# Patient Record
Sex: MALE | Race: WHITE | HISPANIC OR LATINO | Employment: FULL TIME | URBAN - METROPOLITAN AREA
[De-identification: names, ages, dates, MRNs, and addresses within clinical notes are randomized per-mention and may not be internally consistent; named-entity substitution may affect disease eponyms.]

---

## 2019-11-18 ENCOUNTER — APPOINTMENT (OUTPATIENT)
Dept: RADIOLOGY | Facility: CLINIC | Age: 34
End: 2019-11-18
Payer: COMMERCIAL

## 2019-11-18 ENCOUNTER — OFFICE VISIT (OUTPATIENT)
Dept: OBGYN CLINIC | Facility: CLINIC | Age: 34
End: 2019-11-18

## 2019-11-18 VITALS
WEIGHT: 262 LBS | DIASTOLIC BLOOD PRESSURE: 72 MMHG | BODY MASS INDEX: 41.12 KG/M2 | SYSTOLIC BLOOD PRESSURE: 109 MMHG | HEART RATE: 79 BPM | HEIGHT: 67 IN

## 2019-11-18 DIAGNOSIS — M25.562 LEFT KNEE PAIN, UNSPECIFIED CHRONICITY: ICD-10-CM

## 2019-11-18 DIAGNOSIS — M25.562 LEFT KNEE PAIN, UNSPECIFIED CHRONICITY: Primary | ICD-10-CM

## 2019-11-18 DIAGNOSIS — M23.92 INTERNAL DERANGEMENT OF LEFT KNEE: ICD-10-CM

## 2019-11-18 PROCEDURE — 73562 X-RAY EXAM OF KNEE 3: CPT

## 2019-11-18 PROCEDURE — 99203 OFFICE O/P NEW LOW 30 MIN: CPT | Performed by: ORTHOPAEDIC SURGERY

## 2019-11-18 RX ORDER — ACETAMINOPHEN 325 MG/1
650 TABLET ORAL EVERY 6 HOURS PRN
COMMUNITY
End: 2022-06-29

## 2019-11-18 RX ORDER — IBUPROFEN 200 MG
TABLET ORAL EVERY 6 HOURS PRN
COMMUNITY
End: 2022-06-29

## 2019-11-18 NOTE — PROGRESS NOTES
Assessment/Plan:  1  Left knee pain, unspecified chronicity  XR knee 3 vw left non injury    MRI knee left  wo contrast   2  Internal derangement of left knee       The patient does have a history and examination consistent with a medial meniscus tear  We are ordering an MRI to rule this out  We did discuss arthroscopic medial meniscectomy showed a tear be found  He will follow up after the MRI to discuss the results and how we will proceed  For now, he may ice the knee and take over-the-counter medications as needed for pain  Subjective:   Zaida Llanos is a 30 y o  male who presents today for evaluation of his left knee  He states her first injured this about 2 years ago when he hit it on something at his [employer]  This bothering him only very intermittently after this injury, but then about a year ago he states that he sustained a significant ankle sprain, and twisted his knee at the same time  Since that time, the knee has been bothering him on a more consistent basis  He notes pain mostly about the medial aspect of the knee  This is worse with activity and better with rest  He notes good ROM of the knee and denies any instability  He does note occasional clicking about the knee  He does not intermittent swelling  Review of Systems   Constitutional: Negative  Negative for chills and fever  HENT: Negative  Negative for ear pain and sore throat  Eyes: Negative  Negative for pain and redness  Respiratory: Negative  Negative for shortness of breath and wheezing  Cardiovascular: Negative for chest pain and palpitations  Gastrointestinal: Negative  Negative for abdominal pain and blood in stool  Endocrine: Negative  Negative for polydipsia and polyuria  Genitourinary: Negative  Negative for difficulty urinating and dysuria  Musculoskeletal:        As noted in HPI   Skin: Negative  Negative for pallor and rash  Neurological: Negative  Negative for dizziness and numbness  Hematological: Negative  Negative for adenopathy  Does not bruise/bleed easily  Psychiatric/Behavioral: Negative  Negative for confusion and suicidal ideas  History reviewed  No pertinent past medical history  History reviewed  No pertinent surgical history  Family History   Problem Relation Age of Onset    Stroke Father     No Known Problems Sister     No Known Problems Brother     No Known Problems Maternal Aunt     No Known Problems Maternal Uncle     No Known Problems Paternal Aunt     No Known Problems Paternal Uncle     No Known Problems Maternal Grandmother     No Known Problems Maternal Grandfather     No Known Problems Paternal Grandmother     No Known Problems Paternal Grandfather        Social History     Occupational History    Not on file   Tobacco Use    Smoking status: Never Smoker    Smokeless tobacco: Never Used   Substance and Sexual Activity    Alcohol use: Yes     Comment: rare    Drug use: Never    Sexual activity: Not on file         Current Outpatient Medications:     acetaminophen (TYLENOL) 325 mg tablet, Take 650 mg by mouth every 6 (six) hours as needed for mild pain, Disp: , Rfl:     ibuprofen (MOTRIN) 200 mg tablet, Take by mouth every 6 (six) hours as needed for mild pain, Disp: , Rfl:     No Known Allergies    Objective:  Vitals:    11/18/19 0804   BP: 109/72   Pulse: 79       Left Knee Exam     Tenderness   The patient is experiencing tenderness in the medial joint line  Range of Motion   Extension: normal   Flexion: normal     Tests   Varus: negative Valgus: negative  Lachman:  Anterior - negative      Drawer:  Anterior - negative     Posterior - negative    Other   Erythema: absent  Sensation: normal  Pulse: present  Swelling: none  Effusion: no effusion present          Observations   Left Knee   Negative for effusion  Physical Exam   Constitutional: He is oriented to person, place, and time   He appears well-developed and well-nourished  No distress  HENT:   Head: Normocephalic and atraumatic  Eyes: Conjunctivae and EOM are normal  No scleral icterus  Neck: No JVD present  Cardiovascular: Normal rate and intact distal pulses  Pulmonary/Chest: Effort normal  No respiratory distress  Musculoskeletal:        Left knee: He exhibits no effusion  Neurological: He is alert and oriented to person, place, and time  Coordination normal    Skin: Skin is warm  Psychiatric: He has a normal mood and affect  I have personally reviewed pertinent films in PACS and my interpretation is as follows:  X-rays left knee: Negative  No acute osseous abnormality

## 2021-01-21 ENCOUNTER — OFFICE VISIT (OUTPATIENT)
Dept: FAMILY MEDICINE CLINIC | Facility: CLINIC | Age: 36
End: 2021-01-21

## 2021-01-21 ENCOUNTER — HOSPITAL ENCOUNTER (EMERGENCY)
Facility: HOSPITAL | Age: 36
Discharge: HOME | End: 2021-01-21
Attending: EMERGENCY MEDICINE | Admitting: EMERGENCY MEDICINE

## 2021-01-21 ENCOUNTER — APPOINTMENT (EMERGENCY)
Dept: RADIOLOGY | Facility: HOSPITAL | Age: 36
End: 2021-01-21

## 2021-01-21 VITALS
DIASTOLIC BLOOD PRESSURE: 78 MMHG | WEIGHT: 260 LBS | BODY MASS INDEX: 41.78 KG/M2 | TEMPERATURE: 98.2 F | SYSTOLIC BLOOD PRESSURE: 120 MMHG | OXYGEN SATURATION: 98 % | RESPIRATION RATE: 18 BRPM | HEIGHT: 66 IN | HEART RATE: 74 BPM

## 2021-01-21 VITALS
RESPIRATION RATE: 20 BRPM | OXYGEN SATURATION: 98 % | BODY MASS INDEX: 40.77 KG/M2 | DIASTOLIC BLOOD PRESSURE: 64 MMHG | HEIGHT: 68 IN | SYSTOLIC BLOOD PRESSURE: 110 MMHG | WEIGHT: 269 LBS | HEART RATE: 78 BPM | TEMPERATURE: 98.1 F

## 2021-01-21 DIAGNOSIS — R06.83 SNORING: ICD-10-CM

## 2021-01-21 DIAGNOSIS — R07.89 ATYPICAL CHEST PAIN: Primary | ICD-10-CM

## 2021-01-21 DIAGNOSIS — E66.01 MORBID OBESITY (HCC): Primary | ICD-10-CM

## 2021-01-21 DIAGNOSIS — R07.2 PRECORDIAL PAIN: ICD-10-CM

## 2021-01-21 DIAGNOSIS — K21.9 GASTROESOPHAGEAL REFLUX DISEASE WITHOUT ESOPHAGITIS: ICD-10-CM

## 2021-01-21 LAB
ALBUMIN SERPL BCP-MCNC: 4.6 G/DL (ref 3.4–4.8)
ALP SERPL-CCNC: 85.8 U/L (ref 10–129)
ALT SERPL W P-5'-P-CCNC: 23 U/L (ref 5–63)
ANION GAP SERPL CALCULATED.3IONS-SCNC: 8 MMOL/L (ref 4–13)
AST SERPL W P-5'-P-CCNC: 18 U/L (ref 15–41)
BASOPHILS # BLD AUTO: 0.06 THOUSANDS/ΜL (ref 0–0.1)
BASOPHILS NFR BLD AUTO: 1 % (ref 0–1)
BILIRUB SERPL-MCNC: 0.4 MG/DL (ref 0.3–1.2)
BUN SERPL-MCNC: 14 MG/DL (ref 6–20)
CALCIUM SERPL-MCNC: 10.2 MG/DL (ref 8.4–10.2)
CHLORIDE SERPL-SCNC: 101 MMOL/L (ref 96–108)
CO2 SERPL-SCNC: 31 MMOL/L (ref 22–33)
CREAT SERPL-MCNC: 0.93 MG/DL (ref 0.5–1.2)
D DIMER PPP FEU-MCNC: 0.21 MG/L FEU (ref 0.19–0.49)
EOSINOPHIL # BLD AUTO: 0.31 THOUSAND/ΜL (ref 0–0.61)
EOSINOPHIL NFR BLD AUTO: 3 % (ref 0–6)
ERYTHROCYTE [DISTWIDTH] IN BLOOD BY AUTOMATED COUNT: 13.3 % (ref 11.6–15.1)
GFR SERPL CREATININE-BSD FRML MDRD: 107 ML/MIN/1.73SQ M
GLUCOSE SERPL-MCNC: 105 MG/DL (ref 65–140)
HCT VFR BLD AUTO: 41.3 % (ref 36.5–49.3)
HGB BLD-MCNC: 14.1 G/DL (ref 12–17)
IMM GRANULOCYTES # BLD AUTO: 0.06 THOUSAND/UL (ref 0–0.2)
IMM GRANULOCYTES NFR BLD AUTO: 1 % (ref 0–2)
LIPASE SERPL-CCNC: 11 U/L (ref 13–60)
LYMPHOCYTES # BLD AUTO: 4.08 THOUSANDS/ΜL (ref 0.6–4.47)
LYMPHOCYTES NFR BLD AUTO: 36 % (ref 14–44)
MCH RBC QN AUTO: 30 PG (ref 26.8–34.3)
MCHC RBC AUTO-ENTMCNC: 34.1 G/DL (ref 31.4–37.4)
MCV RBC AUTO: 88 FL (ref 82–98)
MONOCYTES # BLD AUTO: 1 THOUSAND/ΜL (ref 0.17–1.22)
MONOCYTES NFR BLD AUTO: 9 % (ref 4–12)
NEUTROPHILS # BLD AUTO: 5.88 THOUSANDS/ΜL (ref 1.85–7.62)
NEUTS SEG NFR BLD AUTO: 50 % (ref 43–75)
PLATELET # BLD AUTO: 302 THOUSANDS/UL (ref 149–390)
PMV BLD AUTO: 9.5 FL (ref 8.9–12.7)
POTASSIUM SERPL-SCNC: 3.8 MMOL/L (ref 3.5–5)
PROT SERPL-MCNC: 7.7 G/DL (ref 6.4–8.3)
RBC # BLD AUTO: 4.7 MILLION/UL (ref 3.88–5.62)
SODIUM SERPL-SCNC: 140 MMOL/L (ref 133–145)
TROPONIN I SERPL-MCNC: <0.03 NG/ML (ref 0–0.07)
WBC # BLD AUTO: 11.39 THOUSAND/UL (ref 4.31–10.16)

## 2021-01-21 PROCEDURE — 96360 HYDRATION IV INFUSION INIT: CPT

## 2021-01-21 PROCEDURE — 80053 COMPREHEN METABOLIC PANEL: CPT | Performed by: EMERGENCY MEDICINE

## 2021-01-21 PROCEDURE — 99285 EMERGENCY DEPT VISIT HI MDM: CPT | Performed by: EMERGENCY MEDICINE

## 2021-01-21 PROCEDURE — 99204 OFFICE O/P NEW MOD 45 MIN: CPT | Performed by: FAMILY MEDICINE

## 2021-01-21 PROCEDURE — 93005 ELECTROCARDIOGRAM TRACING: CPT

## 2021-01-21 PROCEDURE — 85379 FIBRIN DEGRADATION QUANT: CPT | Performed by: EMERGENCY MEDICINE

## 2021-01-21 PROCEDURE — 99285 EMERGENCY DEPT VISIT HI MDM: CPT

## 2021-01-21 PROCEDURE — 85025 COMPLETE CBC W/AUTO DIFF WBC: CPT | Performed by: EMERGENCY MEDICINE

## 2021-01-21 PROCEDURE — 36415 COLL VENOUS BLD VENIPUNCTURE: CPT | Performed by: EMERGENCY MEDICINE

## 2021-01-21 PROCEDURE — 71045 X-RAY EXAM CHEST 1 VIEW: CPT

## 2021-01-21 PROCEDURE — 83690 ASSAY OF LIPASE: CPT | Performed by: EMERGENCY MEDICINE

## 2021-01-21 PROCEDURE — 84484 ASSAY OF TROPONIN QUANT: CPT | Performed by: EMERGENCY MEDICINE

## 2021-01-21 RX ORDER — ASPIRIN 81 MG/1
81 TABLET ORAL DAILY
Qty: 90 TABLET | Refills: 1 | Status: SHIPPED | OUTPATIENT
Start: 2021-01-21 | End: 2022-06-29

## 2021-01-21 RX ORDER — OMEPRAZOLE 40 MG/1
40 CAPSULE, DELAYED RELEASE ORAL
Qty: 90 CAPSULE | Refills: 1 | Status: SHIPPED | OUTPATIENT
Start: 2021-01-21 | End: 2022-06-29

## 2021-01-21 RX ORDER — ASPIRIN 81 MG/1
324 TABLET, CHEWABLE ORAL ONCE
Status: COMPLETED | OUTPATIENT
Start: 2021-01-21 | End: 2021-01-21

## 2021-01-21 RX ADMIN — ASPIRIN 81 MG CHEWABLE TABLET 324 MG: 81 TABLET CHEWABLE at 04:47

## 2021-01-21 RX ADMIN — SODIUM CHLORIDE 1000 ML: 0.9 INJECTION, SOLUTION INTRAVENOUS at 04:47

## 2021-01-21 NOTE — ED PROCEDURE NOTE
PROCEDURE  ECG 12 Lead Documentation Only    Date/Time: 1/21/2021 4:32 AM  Performed by: Darrick Guallpa MD  Authorized by:  Darrick Guallpa MD     Indications / Diagnosis:  Chest pain  ECG reviewed by me, the ED Provider: yes    Patient location:  ED  Previous ECG:     Previous ECG:  Unavailable    Comparison to cardiac monitor: Yes    Interpretation:     Interpretation: abnormal    Rate:     ECG rate:  81    ECG rate assessment: normal    Rhythm:     Rhythm: sinus rhythm    Ectopy:     Ectopy: none    QRS:     QRS axis:  Left    QRS intervals:  Normal  Conduction:     Conduction: normal    ST segments:     ST segments:  Normal  T waves:     T waves: normal    Comments:      No acute ischemia or infarction         Darrick Guallpa MD  01/21/21 0967

## 2021-01-21 NOTE — PROGRESS NOTES
Subjective:      Patient ID: Jefe Orourke is a 37 y o  male  Chest pain- woke him up from sleep , he had similar episodes for past 1-2 weeks intermittent, self limited- described and retrosternal sharp shooting pain and some over left precordium, he was in the ER today and did have labs and EKG which were within normal limits, he also did have  Normal chest xray    Obesity and Reflux- states that he drinks 2x32 oz cups of dark black coffee, eats chicken wings, fried food, pizzas, mainly fatty food, french fried, he has gained 130 lbs in 15 years    He does reports snoring at night, wife states in past 2 months-she sees him becoming SOB in sleep but no apneas, snoring has got worse      History reviewed  No pertinent past medical history  Family History   Problem Relation Age of Onset    Hypertension Mother     Stroke Father     No Known Problems Sister     No Known Problems Brother     No Known Problems Maternal Aunt     No Known Problems Maternal Uncle     No Known Problems Paternal Aunt     No Known Problems Paternal Uncle     No Known Problems Maternal Grandmother     No Known Problems Maternal Grandfather     No Known Problems Paternal Grandmother     No Known Problems Paternal Grandfather        History reviewed  No pertinent surgical history  reports that he has never smoked  He has never used smokeless tobacco  He reports previous alcohol use  He reports that he does not use drugs        Current Outpatient Medications:     acetaminophen (TYLENOL) 325 mg tablet, Take 650 mg by mouth every 6 (six) hours as needed for mild pain, Disp: , Rfl:     ibuprofen (MOTRIN) 200 mg tablet, Take by mouth every 6 (six) hours as needed for mild pain, Disp: , Rfl:     aspirin (ECOTRIN LOW STRENGTH) 81 mg EC tablet, Take 1 tablet (81 mg total) by mouth daily, Disp: 90 tablet, Rfl: 1    omeprazole (PriLOSEC) 40 MG capsule, Take 1 capsule (40 mg total) by mouth daily in the early morning, Disp: 90 capsule, Rfl: 1  No current facility-administered medications for this visit  The following portions of the patient's history were reviewed and updated as appropriate: allergies, current medications, past family history, past medical history, past social history, past surgical history and problem list     Review of Systems   Constitutional: Negative for activity change, chills, diaphoresis, fatigue and fever  HENT: Negative for congestion, ear discharge, ear pain, mouth sores, nosebleeds, postnasal drip, rhinorrhea, sinus pressure, sinus pain, sore throat, tinnitus and voice change  Eyes: Negative for photophobia, pain, discharge, redness and visual disturbance  Respiratory: Negative for shortness of breath, wheezing and stridor  Cardiovascular: Negative for chest pain and palpitations  Gastrointestinal: Negative for abdominal pain, blood in stool, constipation, diarrhea, nausea and vomiting  Endocrine: Negative for cold intolerance and heat intolerance  Musculoskeletal: Negative for arthralgias, back pain, joint swelling and myalgias  Skin: Negative for pallor and rash  Neurological: Negative for dizziness, tremors, seizures, syncope, speech difficulty, weakness and headaches  Psychiatric/Behavioral: Negative for behavioral problems, decreased concentration, hallucinations and suicidal ideas  The patient is not nervous/anxious  Objective:    /64 (BP Location: Left arm, Patient Position: Sitting, Cuff Size: Large)   Pulse 78   Temp 98 1 °F (36 7 °C) (Temporal)   Resp 20   Ht 5' 8" (1 727 m)   Wt 122 kg (269 lb)   SpO2 98%   BMI 40 90 kg/m²      Physical Exam  Vitals signs and nursing note reviewed  Constitutional:       Appearance: Normal appearance  He is well-developed  He is obese  HENT:      Head: Normocephalic and atraumatic        Right Ear: External ear normal       Left Ear: External ear normal       Nose: Nose normal    Eyes:      Conjunctiva/sclera: Conjunctivae normal       Pupils: Pupils are equal, round, and reactive to light  Neck:      Musculoskeletal: Normal range of motion and neck supple  Cardiovascular:      Rate and Rhythm: Normal rate and regular rhythm  Heart sounds: Normal heart sounds  No murmur  No gallop  Pulmonary:      Effort: Pulmonary effort is normal  No respiratory distress  Breath sounds: Normal breath sounds  No wheezing or rales  Abdominal:      General: There is no distension  Palpations: Abdomen is soft  Tenderness: There is no abdominal tenderness  Musculoskeletal:         General: No tenderness or deformity  Lymphadenopathy:      Cervical: No cervical adenopathy  Skin:     Coloration: Skin is not pale  Findings: No erythema or rash  Neurological:      General: No focal deficit present  Mental Status: He is alert and oriented to person, place, and time  Mental status is at baseline     Psychiatric:         Mood and Affect: Mood normal          Behavior: Behavior normal            Recent Results (from the past 1008 hour(s))   CBC and differential    Collection Time: 01/21/21  4:38 AM   Result Value Ref Range    WBC 11 39 (H) 4 31 - 10 16 Thousand/uL    RBC 4 70 3 88 - 5 62 Million/uL    Hemoglobin 14 1 12 0 - 17 0 g/dL    Hematocrit 41 3 36 5 - 49 3 %    MCV 88 82 - 98 fL    MCH 30 0 26 8 - 34 3 pg    MCHC 34 1 31 4 - 37 4 g/dL    RDW 13 3 11 6 - 15 1 %    MPV 9 5 8 9 - 12 7 fL    Platelets 709 923 - 332 Thousands/uL    Neutrophils Relative 50 43 - 75 %    Immat GRANS % 1 0 - 2 %    Lymphocytes Relative 36 14 - 44 %    Monocytes Relative 9 4 - 12 %    Eosinophils Relative 3 0 - 6 %    Basophils Relative 1 0 - 1 %    Neutrophils Absolute 5 88 1 85 - 7 62 Thousands/µL    Immature Grans Absolute 0 06 0 00 - 0 20 Thousand/uL    Lymphocytes Absolute 4 08 0 60 - 4 47 Thousands/µL    Monocytes Absolute 1 00 0 17 - 1 22 Thousand/µL    Eosinophils Absolute 0 31 0 00 - 0 61 Thousand/µL    Basophils Absolute 0 06 0 00 - 0 10 Thousands/µL   Comprehensive metabolic panel    Collection Time: 01/21/21  4:38 AM   Result Value Ref Range    Sodium 140 133 - 145 mmol/L    Potassium 3 8 3 5 - 5 0 mmol/L    Chloride 101 96 - 108 mmol/L    CO2 31 22 - 33 mmol/L    ANION GAP 8 4 - 13 mmol/L    BUN 14 6 - 20 mg/dL    Creatinine 0 93 0 50 - 1 20 mg/dL    Glucose 105 65 - 140 mg/dL    Calcium 10 2 8 4 - 10 2 mg/dL    AST 18 15 - 41 U/L    ALT 23 5 - 63 U/L    Alkaline Phosphatase 85 8 10 - 129 U/L    Total Protein 7 7 6 4 - 8 3 g/dL    Albumin 4 6 3 4 - 4 8 g/dL    Total Bilirubin 0 40 0 30 - 1 20 mg/dL    eGFR 107 ml/min/1 73sq m   Troponin I    Collection Time: 01/21/21  4:38 AM   Result Value Ref Range    Troponin I <0 03 0 00 - 0 07 ng/mL   Lipase    Collection Time: 01/21/21  4:38 AM   Result Value Ref Range    Lipase 11 (L) 13 - 60 u/L   D-Dimer    Collection Time: 01/21/21  4:44 AM   Result Value Ref Range    D-Dimer, Quant  0 21 0 19 - 0 49 mg/L FEU       Assessment/Plan:         Diagnoses and all orders for this visit:    Morbid obesity ([address] )  -     Lipid panel; Future    Precordial pain  -     TSH, 3rd generation with Free T4 reflex; Future  -     Echo stress test w contrast if indicated; Future  -     aspirin (ECOTRIN LOW STRENGTH) 81 mg EC tablet; Take 1 tablet (81 mg total) by mouth daily    Gastroesophageal reflux disease without esophagitis  -     omeprazole (PriLOSEC) 40 MG capsule; Take 1 capsule (40 mg total) by mouth daily in the early morning    Snoring          Discussed importance of diet and lifestyle modifications to control GERD symptoms  Discussed the importance of eating small, frequent meals instead of large meals     Patient  was counseled on  behavioral modification including avoiding spices, Caffeine, tomato gravy, fried and fatty food , dark chocolate,wine and other caffeinated drinks along with the last meal at least 3 hours from bedtime and do not lay down 2-3 hours following a meal       Also counseled on maintaining the head elevated at 30 degree shaun when sleeping  No red flag symptoms including hematemesis, abdominal pain or weight loss reported  Given a script for Omeprazole 40 QD  BMI- 40 90, GAINED  130LBS  Discussed risks of obesity, diet and exercise plan with patient  I have assessed patient's risks  I have advised specific, and personalized behavior change advice  Patient has accepted the advise and agreed to 1500 [address] diet, exercise 150 min/week , behavior modification  I have provided patient information and instructions for weight loss  I have arranged for appropriate follow up  Will check stress echo-he is aware that it may be expensive to do testing without insurance  I do recommend a sleep study will wait until echo stress test is done and due to cost afordability barrier  Read package inserts for all medications before starting a new medications, call me if you have any questions  Patient was given opportunity to ask questions and all questions were answered

## 2021-01-21 NOTE — ED PROVIDER NOTES
History  Chief Complaint   Patient presents with    Chest Pain     chest pain since 10:30 pm today, with sob, cold sweats and vomiting x 1, pt reports pain as sharp and to the left of the chest radiating to left arm, pt reports pain increases with respiration  This is a 65-year-old male that ambulated to the emergency department this morning accompanied by his wife  Patient states that about 10 30 last evening he developed left-sided chest pain with numbness and tingling of the left arm  He states that he has had the pain intermittently since 10/30  States he also has shortness of breath with this  Denies history of asthma or COPD  Patient states he has never been a smoker  Patient has a strong family history of cardiac disease however does not have cardiac disease himself  Denies risk factors such as smoking, hypertension or diabetes  Patient denies increase in pain with exertion  He denies any aggravating or alleviating factors that he knows of  Patient did not take anything for the pain at home  Patient denies fever chills or myalgias  Denies any known exposure to COVID          Prior to Admission Medications   Prescriptions Last Dose Informant Patient Reported? Taking?   acetaminophen (TYLENOL) 325 mg tablet   Yes No   Sig: Take 650 mg by mouth every 6 (six) hours as needed for mild pain   ibuprofen (MOTRIN) 200 mg tablet   Yes No   Sig: Take by mouth every 6 (six) hours as needed for mild pain      Facility-Administered Medications: None       History reviewed  No pertinent past medical history  History reviewed  No pertinent surgical history      Family History   Problem Relation Age of Onset    Stroke Father     No Known Problems Sister     No Known Problems Brother     No Known Problems Maternal Aunt     No Known Problems Maternal Uncle     No Known Problems Paternal Aunt     No Known Problems Paternal Uncle     No Known Problems Maternal Grandmother     No Known Problems Maternal Grandfather     No Known Problems Paternal Grandmother     No Known Problems Paternal Grandfather      I have reviewed and agree with the history as documented  E-Cigarette/Vaping     E-Cigarette/Vaping Substances     Social History     Tobacco Use    Smoking status: Never Smoker    Smokeless tobacco: Never Used   Substance Use Topics    Alcohol use: Yes     Comment: rare    Drug use: Never       Review of Systems   Constitutional: Negative for activity change, appetite change, chills, diaphoresis, fatigue, fever and unexpected weight change  HENT: Negative for congestion, ear discharge, ear pain, mouth sores, sinus pressure, sinus pain, sneezing, sore throat, trouble swallowing and voice change  Eyes: Negative for photophobia, pain, discharge, redness, itching and visual disturbance  Respiratory: Positive for shortness of breath  Negative for cough and chest tightness  Cardiovascular: Positive for chest pain  Negative for palpitations and leg swelling  Gastrointestinal: Negative for abdominal pain, constipation, nausea and vomiting  Endocrine: Negative for cold intolerance, heat intolerance, polydipsia, polyphagia and polyuria  Genitourinary: Negative for decreased urine volume, difficulty urinating, dysuria, frequency, hematuria and urgency  Musculoskeletal: Negative for arthralgias, back pain, gait problem, joint swelling, myalgias, neck pain and neck stiffness  Skin: Negative for color change and rash  Allergic/Immunologic: Negative for immunocompromised state  Neurological: Negative for dizziness, tremors, seizures, syncope, speech difficulty, weakness, light-headedness, numbness and headaches  Hematological: Does not bruise/bleed easily  Psychiatric/Behavioral: Negative for behavioral problems and suicidal ideas  Physical Exam  Physical Exam  Vitals signs and nursing note reviewed  Constitutional:       General: He is not in acute distress       Appearance: Normal appearance  He is well-developed and normal weight  He is not ill-appearing, toxic-appearing or diaphoretic  HENT:      Head: Normocephalic and atraumatic  Right Ear: Tympanic membrane, ear canal and external ear normal  There is no impacted cerumen  Left Ear: Tympanic membrane, ear canal and external ear normal  There is no impacted cerumen  Nose: No congestion or rhinorrhea  Mouth/Throat:      Mouth: Mucous membranes are moist       Pharynx: Oropharynx is clear  No oropharyngeal exudate or posterior oropharyngeal erythema  Eyes:      General: No scleral icterus  Right eye: No discharge  Left eye: No discharge  Extraocular Movements: Extraocular movements intact  Conjunctiva/sclera: Conjunctivae normal       Pupils: Pupils are equal, round, and reactive to light  Neck:      Musculoskeletal: Normal range of motion and neck supple  No neck rigidity or muscular tenderness  Vascular: No JVD  Trachea: No tracheal deviation  Cardiovascular:      Rate and Rhythm: Normal rate and regular rhythm  Pulses:           Carotid pulses are 2+ on the right side and 2+ on the left side  Radial pulses are 2+ on the right side and 2+ on the left side  Dorsalis pedis pulses are 2+ on the right side and 2+ on the left side  Posterior tibial pulses are 2+ on the right side and 2+ on the left side  Heart sounds: Normal heart sounds  No murmur  Pulmonary:      Effort: Pulmonary effort is normal  No respiratory distress  Breath sounds: Normal breath sounds  No wheezing or rales  Chest:      Chest wall: No tenderness  Abdominal:      General: Bowel sounds are normal       Palpations: Abdomen is soft  There is no mass  Tenderness: There is no abdominal tenderness  There is no right CVA tenderness, left CVA tenderness or guarding  Hernia: No hernia is present  Musculoskeletal: Normal range of motion           General: No swelling, tenderness, deformity or signs of injury  Right lower leg: No edema  Left lower leg: No edema  Lymphadenopathy:      Cervical: No cervical adenopathy  Skin:     General: Skin is warm and dry  Capillary Refill: Capillary refill takes less than 2 seconds  Findings: No bruising, erythema or rash  Neurological:      General: No focal deficit present  Mental Status: He is alert and oriented to person, place, and time  Mental status is at baseline  Cranial Nerves: No cranial nerve deficit  Sensory: No sensory deficit  Motor: No weakness or abnormal muscle tone  Coordination: Coordination normal       Gait: Gait normal       Deep Tendon Reflexes: Reflexes normal    Psychiatric:         Mood and Affect: Mood normal          Behavior: Behavior normal          Thought Content:  Thought content normal          Judgment: Judgment normal          Vital Signs  ED Triage Vitals [01/21/21 0432]   Temperature Pulse Respirations Blood Pressure SpO2   98 2 °F (36 8 °C) 79 22 (!) 130/104 100 %      Temp src Heart Rate Source Patient Position - Orthostatic VS BP Location FiO2 (%)   -- Monitor Lying Left arm --      Pain Score       8           Vitals:    01/21/21 0432 01/21/21 0438 01/21/21 0527   BP: (!) 130/104 128/77 120/78   Pulse: 79  74   Patient Position - Orthostatic VS: Lying  Lying         Visual Acuity      ED Medications  Medications   aspirin chewable tablet 324 mg (324 mg Oral Given 1/21/21 0447)   sodium chloride 0 9 % bolus 1,000 mL (0 mL Intravenous Stopped 1/21/21 0543)       Diagnostic Studies  Results Reviewed     Procedure Component Value Units Date/Time    Lipase [[id number]]  (Abnormal) Collected: 01/21/21 0438    Lab Status: Final result Specimen: Blood from Arm, Right Updated: 01/21/21 0532     Lipase 11 u/L     D-Dimer [[id number]]  (Normal) Collected: 01/21/21 0444    Lab Status: Final result Specimen: Blood from Arm, Right Updated: 01/21/21 0532 D-Dimer, Quant  0 21 mg/L FEU     Troponin I [[id number]]  (Normal) Collected: 01/21/21 0438    Lab Status: Final result Specimen: Blood from Arm, Right Updated: 01/21/21 0504     Troponin I <0 03 ng/mL     Comprehensive metabolic panel [[id number]] Collected: 01/21/21 0438    Lab Status: Final result Specimen: Blood from Arm, Right Updated: 01/21/21 0502     Sodium 140 mmol/L      Potassium 3 8 mmol/L      Chloride 101 mmol/L      CO2 31 mmol/L      ANION GAP 8 mmol/L      BUN 14 mg/dL      Creatinine 0 93 mg/dL      Glucose 105 mg/dL      Calcium 10 2 mg/dL      AST 18 U/L      ALT 23 U/L      Alkaline Phosphatase 85 8 U/L      Total Protein 7 7 g/dL      Albumin 4 6 g/dL      Total Bilirubin 0 40 mg/dL      eGFR 107 ml/min/1 73sq m     Narrative:      [place] guidelines for Chronic Kidney Disease (CKD):     Stage 1 with normal or high GFR (GFR > 90 mL/min/1 73 square meters)    Stage 2 Mild CKD (GFR = 60-89 mL/min/1 73 square meters)    Stage 3A Moderate CKD (GFR = 45-59 mL/min/1 73 square meters)    Stage 3B Moderate CKD (GFR = 30-44 mL/min/1 73 square meters)    Stage 4 Severe CKD (GFR = 15-29 mL/min/1 73 square meters)    Stage 5 End Stage CKD (GFR <15 mL/min/1 73 square meters)  Note: GFR calculation is accurate only with a steady state creatinine    CBC and differential [[id number]]  (Abnormal) Collected: 01/21/21 0438    Lab Status: Final result Specimen: Blood from Arm, Right Updated: 01/21/21 0446     WBC 11 39 Thousand/uL      RBC 4 70 Million/uL      Hemoglobin 14 1 g/dL      Hematocrit 41 3 %      MCV 88 fL      MCH 30 0 pg      MCHC 34 1 g/dL      RDW 13 3 %      MPV 9 5 fL      Platelets 882 Thousands/uL      Neutrophils Relative 50 %      Immat GRANS % 1 %      Lymphocytes Relative 36 %      Monocytes Relative 9 %      Eosinophils Relative 3 %      Basophils Relative 1 %      Neutrophils Absolute 5 88 Thousands/µL      Immature Grans Absolute 0 06 Thousand/uL Lymphocytes Absolute 4 08 Thousands/µL      Monocytes Absolute 1 00 Thousand/µL      Eosinophils Absolute 0 31 Thousand/µL      Basophils Absolute 0 06 Thousands/µL                  XR chest 1 view portable   ED Interpretation by Piotr Ayala MD (01/21 1925)   No acute findings                 Procedures  Procedures         ED Course  ED Course as of Jan 21 0547   u Jan 21, 2021   0543 This is a 82-year-old male who ambulates emergency department accompanied by his wife  Patient reports that 10 30 last evening he developed left-sided chest pain which she describes as both a pressure and stabbing and it radiated down his left arm  He has no cardiac history but he has strong family cardiac history  He has no history of risk factors other than his family history  The patient was given 4 aspirin per protocol  D-dimer is negative  Concern for PE  Troponin negative  Only 1 troponin as pain started over 6 hours ago I so the head this med cardiac event did troponin would be rising  Slight leukocytosis 11 39  Remainder of the CBC was normal with a stable hemoglobin and hematocrit 14 1 and 41 3  Lipase was low at 11  CMP was completely normal   No acute findings a chest x-ray  No acute findings on EKG  Vital signs were stable  No indication for sepsis  Patient was reexamined at this time and informed of laboratory and/or imaging results and was found to be stable for discharge  Return to emergency department criteria was reviewed with the patient who verbalized understanding and was agreeable to discharge and the treatment plan at this time  Portions of the record may have been created with voice recognition software  Occasional wrong word or "sound a like" substitutions may have occurred due to the inherent limitations of voice recognition software  Read the chart carefully and recognize, using context, where substitutions have occurred                       HEART Risk Score      Most Recent Value Heart Score Risk Calculator   History  0 Filed at: 01/21/2021 0539   ECG  0 Filed at: 01/21/2021 0539   Age  0 Filed at: 01/21/2021 0539   Risk Factors  1 Filed at: 01/21/2021 0539   Troponin  0 Filed at: 01/21/2021 0539   HEART Score  1 Filed at: 01/21/2021 0539                      SBIRT 20yo+      Most Recent Value   SBIRT (23 yo +)   In order to provide better care to our patients, we are screening all of our patients for alcohol and drug use  Would it be okay to ask you these screening questions? No Filed at: 01/21/2021 0440                    MDM  Number of Diagnoses or Management Options  Atypical chest pain: new and requires workup  Diagnosis management comments: ACS,GERD,STEMI,NSTEMI,chest wall pain  PE       Amount and/or Complexity of Data Reviewed  Clinical lab tests: ordered and reviewed  Tests in the radiology section of CPT®: ordered and reviewed  Obtain history from someone other than the patient: yes (wife)  Independent visualization of images, tracings, or specimens: yes    Risk of Complications, Morbidity, and/or Mortality  Presenting problems: high  Diagnostic procedures: moderate  Management options: moderate    Patient Progress  Patient progress: improved      Disposition  Final diagnoses:   Atypical chest pain     Time reflects when diagnosis was documented in both MDM as applicable and the Disposition within this note     Time User Action Codes Description Comment    1/21/2021  5:37 AM Finesse Garcia Add [R07 89] Atypical chest pain       ED Disposition     ED Disposition Condition Date/Time Comment    Discharge Stable u Jan 21, 2021  5:37 AM Yohan Dickey discharge to home/self care              Follow-up Information     Follow up With Specialties Details Why Contact Jon Villalta MD Family Medicine Schedule an appointment as soon as possible for a visit in 3 days  [address]  [address]  [phone]            Discharge Medication List as of 1/21/2021 5:38 AM      CONTINUE these medications which have NOT CHANGED    Details   acetaminophen (TYLENOL) 325 mg tablet Take 650 mg by mouth every 6 (six) hours as needed for mild pain, Historical Med      ibuprofen (MOTRIN) 200 mg tablet Take by mouth every 6 (six) hours as needed for mild pain, Historical Med           No discharge procedures on file      PDMP Review     None          ED Provider  Electronically Signed by           Adonay Fuller MD  01/21/21 6828

## 2021-01-22 LAB
ATRIAL RATE: 82 BPM
P AXIS: 4 DEGREES
PR INTERVAL: 147 MS
QRS AXIS: -19 DEGREES
QRSD INTERVAL: 98 MS
QT INTERVAL: 360 MS
QTC INTERVAL: 418 MS
T WAVE AXIS: 37 DEGREES
VENTRICULAR RATE: 81 BPM

## 2021-01-22 PROCEDURE — 93010 ELECTROCARDIOGRAM REPORT: CPT | Performed by: INTERNAL MEDICINE

## 2021-01-29 ENCOUNTER — APPOINTMENT (OUTPATIENT)
Dept: RADIOLOGY | Facility: CLINIC | Age: 36
End: 2021-01-29

## 2021-01-29 ENCOUNTER — OFFICE VISIT (OUTPATIENT)
Dept: OBGYN CLINIC | Facility: CLINIC | Age: 36
End: 2021-01-29

## 2021-01-29 VITALS
BODY MASS INDEX: 40.92 KG/M2 | SYSTOLIC BLOOD PRESSURE: 117 MMHG | HEART RATE: 69 BPM | DIASTOLIC BLOOD PRESSURE: 77 MMHG | HEIGHT: 68 IN | WEIGHT: 270 LBS

## 2021-01-29 DIAGNOSIS — Z01.89 ENCOUNTER FOR LOWER EXTREMITY COMPARISON IMAGING STUDY: ICD-10-CM

## 2021-01-29 DIAGNOSIS — M23.92 KNEE INTERNAL DERANGEMENT, LEFT: Primary | ICD-10-CM

## 2021-01-29 DIAGNOSIS — M25.562 LEFT KNEE PAIN, UNSPECIFIED CHRONICITY: ICD-10-CM

## 2021-01-29 DIAGNOSIS — M25.462 EFFUSION OF LEFT KNEE: ICD-10-CM

## 2021-01-29 PROCEDURE — 99214 OFFICE O/P EST MOD 30 MIN: CPT | Performed by: ORTHOPAEDIC SURGERY

## 2021-01-29 PROCEDURE — 73560 X-RAY EXAM OF KNEE 1 OR 2: CPT

## 2021-01-29 PROCEDURE — 73562 X-RAY EXAM OF KNEE 3: CPT

## 2021-01-29 NOTE — PROGRESS NOTES
Assessment/Plan:  1  Knee internal derangement, left  MRI knee left  wo contrast   2  Left knee pain, unspecified chronicity  XR knee 3 vw left non injury    MRI knee left  wo contrast   3  Encounter for lower extremity comparison imaging study  XR knee 1 or 2 vw right   4  Effusion of left knee  MRI knee left  wo contrast       Scribe Attestation    I,:  Vernadine Carbine am acting as a scribe while in the presence of the attending physician :       I,:  Raffi Freire MD personally performed the services described in this documentation    as scribed in my presence :           Karin Dash upon examination and review the x-rays of the left knee does demonstrate signs and symptoms that are concerning for medial meniscus tear  He is point tender at the posterior medial joint line and does demonstrate a positive Coy's and Thessaly's on exam today  He does also demonstrate an effusion  I do believe that the clinical signs he demonstrates today as well as his history does warrant an MRI of the left knee to question medial meniscus tear  I did provide him with a prescription for this today  I did discuss with him that should the MRI demonstrate a tear we can provide him with a left knee arthroscopy with medial meniscectomy  I did discuss the procedure at length today  Karin Crewsen verbalized understanding of all information provided to her today and had no further questions  I will see him back when he has the MRI of his left knee completed  Subjective:   Polo Lawson is a 37 y o  male who presents to the office today for follow-up evaluation of his left knee  He has been experiencing activity related pain both the left  knee over the past couple years  He does have a history of suspected medial meniscectomy in the left knee last evaluated on 11/18/2019  This stems from an injury he suffered when he inverted his ankle resulting in a forceful twisting mechanism to his knee resulting in his painful symptoms    He states that over the past 3 weeks he has had recurrence of painful symptoms to the medial aspect of his knee  He states that deep knee squatting will exacerbate his pain as well as prolonged weight-bearing  He describes the pain is intermittent and moderate sharp and aching pain  He denies any mechanical symptoms such as popping, clicking, or locking of the knee  He does note that the pain can cause sedation of instability of his knee and does appreciate weakness overall  He does appreciate swelling to the knee that can cause restrictions range of motion  He has been trying to self treat with elevation, and ice  Today he denies any distal paresthesias  Review of Systems   Constitutional: Negative for chills, fever and unexpected weight change  HENT: Negative for hearing loss, nosebleeds and sore throat  Eyes: Negative for pain, redness and visual disturbance  Respiratory: Negative for cough, shortness of breath and wheezing  Cardiovascular: Negative for chest pain, palpitations and leg swelling  Gastrointestinal: Negative for abdominal pain, nausea and vomiting  Endocrine: Negative for polyphagia and polyuria  Genitourinary: Negative for dysuria and hematuria  Musculoskeletal: Positive for arthralgias, joint swelling and myalgias  See HPI   Skin: Negative for rash and wound  Neurological: Negative for dizziness, numbness and headaches  Psychiatric/Behavioral: Negative for decreased concentration and suicidal ideas  The patient is not nervous/anxious  History reviewed  No pertinent past medical history  History reviewed  No pertinent surgical history      Family History   Problem Relation Age of Onset    Hypertension Mother     Stroke Father     No Known Problems Sister     No Known Problems Brother     No Known Problems Maternal Aunt     No Known Problems Maternal Uncle     No Known Problems Paternal Aunt     No Known Problems Paternal Uncle     No Known Problems Maternal Grandmother     No Known Problems Maternal Grandfather     No Known Problems Paternal Grandmother     No Known Problems Paternal Grandfather        Social History     Occupational History    Not on file   Tobacco Use    Smoking status: Never Smoker    Smokeless tobacco: Never Used   Substance and Sexual Activity    Alcohol use: Not Currently     Frequency: Never     Comment: rare    Drug use: Never    Sexual activity: Not on file         Current Outpatient Medications:     acetaminophen (TYLENOL) 325 mg tablet, Take 650 mg by mouth every 6 (six) hours as needed for mild pain, Disp: , Rfl:     aspirin (ECOTRIN LOW STRENGTH) 81 mg EC tablet, Take 1 tablet (81 mg total) by mouth daily, Disp: 90 tablet, Rfl: 1    ibuprofen (MOTRIN) 200 mg tablet, Take by mouth every 6 (six) hours as needed for mild pain, Disp: , Rfl:     omeprazole (PriLOSEC) 40 MG capsule, Take 1 capsule (40 mg total) by mouth daily in the early morning, Disp: 90 capsule, Rfl: 1    No Known Allergies    Objective:  Vitals:    01/29/21 0835   BP: 117/77   Pulse: 69       Left Knee Exam     Tenderness   The patient is experiencing tenderness in the medial joint line  Range of Motion   Extension: 0   Flexion: 120     Tests   Coy:  Medial - positive Lateral - negative  Varus: negative Valgus: negative  Drawer:  Anterior - negative     Posterior - negative    Other   Erythema: absent  Scars: absent  Sensation: normal  Pulse: present  Effusion: effusion ( trace) present    Comments: Thessaly's maneuver: Positive          Observations   Left Knee   Positive for effusion ( trace)  Physical Exam  Vitals signs reviewed  HENT:      Head: Normocephalic and atraumatic  Eyes:      General:         Right eye: No discharge  Left eye: No discharge  Conjunctiva/sclera: Conjunctivae normal       Pupils: Pupils are equal, round, and reactive to light     Neck:      Musculoskeletal: Normal range of motion and neck supple  Cardiovascular:      Rate and Rhythm: Normal rate  Pulmonary:      Effort: Pulmonary effort is normal  No respiratory distress  Musculoskeletal:      Left knee: He exhibits effusion ( trace)  Comments:   As noted in HPI   Skin:     General: Skin is warm and dry  Neurological:      Mental Status: He is alert and oriented to person, place, and time  Psychiatric:         Mood and Affect: Mood normal          Behavior: Behavior normal          I have personally reviewed pertinent films in PACS and my interpretation is as follows:    X-rays of the left knee demonstrate no acute fracture or other osseous abnormalities  Similar findings are present in the right knee

## 2021-02-10 ENCOUNTER — HOSPITAL ENCOUNTER (OUTPATIENT)
Dept: MRI IMAGING | Facility: HOSPITAL | Age: 36
Discharge: HOME | End: 2021-02-10
Attending: ORTHOPAEDIC SURGERY
Payer: COMMERCIAL

## 2021-02-10 DIAGNOSIS — M25.462 EFFUSION OF LEFT KNEE: ICD-10-CM

## 2021-02-10 DIAGNOSIS — M25.562 LEFT KNEE PAIN, UNSPECIFIED CHRONICITY: ICD-10-CM

## 2021-02-10 DIAGNOSIS — M23.92 KNEE INTERNAL DERANGEMENT, LEFT: ICD-10-CM

## 2021-02-10 PROCEDURE — 73721 MRI JNT OF LWR EXTRE W/O DYE: CPT

## 2021-02-10 PROCEDURE — G1004 CDSM NDSC: HCPCS

## 2021-02-17 ENCOUNTER — OFFICE VISIT (OUTPATIENT)
Dept: OBGYN CLINIC | Facility: CLINIC | Age: 36
End: 2021-02-17

## 2021-02-17 VITALS
WEIGHT: 269.6 LBS | HEART RATE: 78 BPM | HEIGHT: 68 IN | BODY MASS INDEX: 40.86 KG/M2 | SYSTOLIC BLOOD PRESSURE: 108 MMHG | DIASTOLIC BLOOD PRESSURE: 75 MMHG

## 2021-02-17 DIAGNOSIS — S86.912D KNEE STRAIN, LEFT, SUBSEQUENT ENCOUNTER: Primary | ICD-10-CM

## 2021-02-17 PROCEDURE — 99213 OFFICE O/P EST LOW 20 MIN: CPT | Performed by: ORTHOPAEDIC SURGERY

## 2021-02-17 NOTE — PROGRESS NOTES
Assessment/Plan:  1  Knee strain, left, subsequent encounter         Scribe Attestation    I,:  Gian Hammonds Call am acting as a scribe while in the presence of the attending physician :       I,:  Honey Castelan MD personally performed the services described in this documentation    as scribed in my presence :             Tena Grande has a normal MRI  There is no evidence of a meniscal tear  He has suffered simply a right knee strain  I explained that this can take some time to heal   I have no restrictions for him as his exam is normal today  The knee is very stable to examination  He can follow-up with me is needed for this  Subjective:   Destinee Cagle is a 37 y o  male who presents to the office today for re-evaluation of his left knee  Jana Cook had been suffering from intermittent pain about the posteromedial aspect of the left knee and a meniscus tear was suspected  An MRI of the right knee was ordered and he has had this study completed we will discuss his results today  Tena Grande is happy to report that he is not experiencing any pain today  He states he will experience pain on occasion depending upon the position that he is resting  This pain remains in the area of the posterior medial aspect of the knee  When the pain occurs he will apply cold spray which tends to relieve his symptoms  Today he has no complaints  Review of Systems   Constitutional: Negative for chills, fever and unexpected weight change  HENT: Negative for hearing loss, nosebleeds and sore throat  Eyes: Negative for pain, redness and visual disturbance  Respiratory: Negative for cough, shortness of breath and wheezing  Cardiovascular: Negative for chest pain, palpitations and leg swelling  Gastrointestinal: Negative for abdominal pain, nausea and vomiting  Endocrine: Negative for polyphagia and polyuria  Genitourinary: Negative for dysuria and hematuria     Musculoskeletal:        See HPI   Skin: Negative for rash and wound    Neurological: Negative for dizziness, numbness and headaches  Psychiatric/Behavioral: Negative for decreased concentration and suicidal ideas  The patient is not nervous/anxious  History reviewed  No pertinent past medical history  History reviewed  No pertinent surgical history  Family History   Problem Relation Age of Onset    Hypertension Mother     Stroke Father     No Known Problems Sister     No Known Problems Brother     No Known Problems Maternal Aunt     No Known Problems Maternal Uncle     No Known Problems Paternal Aunt     No Known Problems Paternal Uncle     No Known Problems Maternal Grandmother     No Known Problems Maternal Grandfather     No Known Problems Paternal Grandmother     No Known Problems Paternal Grandfather        Social History     Occupational History    Not on file   Tobacco Use    Smoking status: Never Smoker    Smokeless tobacco: Never Used   Substance and Sexual Activity    Alcohol use: Not Currently     Frequency: Never     Comment: rare    Drug use: Never    Sexual activity: Not on file         Current Outpatient Medications:     acetaminophen (TYLENOL) 325 mg tablet, Take 650 mg by mouth every 6 (six) hours as needed for mild pain, Disp: , Rfl:     aspirin (ECOTRIN LOW STRENGTH) 81 mg EC tablet, Take 1 tablet (81 mg total) by mouth daily, Disp: 90 tablet, Rfl: 1    ibuprofen (MOTRIN) 200 mg tablet, Take by mouth every 6 (six) hours as needed for mild pain, Disp: , Rfl:     omeprazole (PriLOSEC) 40 MG capsule, Take 1 capsule (40 mg total) by mouth daily in the early morning, Disp: 90 capsule, Rfl: 1    No Known Allergies    Objective:  Vitals:    02/17/21 0827   BP: 108/75   Pulse: 78       Left Knee Exam     Muscle Strength   The patient has normal left knee strength  Tenderness   The patient is experiencing tenderness in the medial joint line      Range of Motion   Extension: 0   Flexion: 120     Tests   Coy:  Medial - negative Lateral - negative  Varus: negative Valgus: negative  Drawer:  Anterior - negative     Posterior - negative    Other   Sensation: normal  Swelling: none  Effusion: no effusion present          Observations   Left Knee   Negative for effusion  Physical Exam  Vitals signs reviewed  Constitutional:       Appearance: He is well-developed  HENT:      Head: Normocephalic and atraumatic  Eyes:      General:         Right eye: No discharge  Left eye: No discharge  Conjunctiva/sclera: Conjunctivae normal    Neck:      Musculoskeletal: Normal range of motion and neck supple  Cardiovascular:      Rate and Rhythm: Regular rhythm  Pulmonary:      Effort: Pulmonary effort is normal  No respiratory distress  Musculoskeletal:      Left knee: He exhibits no effusion  Skin:     General: Skin is warm and dry  Neurological:      Mental Status: He is alert and oriented to person, place, and time  Psychiatric:         Behavior: Behavior normal          I have personally reviewed pertinent films in PACS and my interpretation is as follows:  MRI of the left knee is normal there is no evidence of internal derangement

## 2021-03-31 ENCOUNTER — TELEPHONE (OUTPATIENT)
Dept: NON INVASIVE DIAGNOSTICS | Facility: HOSPITAL | Age: 36
End: 2021-03-31

## 2022-06-29 ENCOUNTER — OFFICE VISIT (OUTPATIENT)
Dept: FAMILY MEDICINE CLINIC | Facility: CLINIC | Age: 37
End: 2022-06-29

## 2022-06-29 VITALS
RESPIRATION RATE: 18 BRPM | DIASTOLIC BLOOD PRESSURE: 76 MMHG | BODY MASS INDEX: 45.36 KG/M2 | TEMPERATURE: 97.3 F | SYSTOLIC BLOOD PRESSURE: 110 MMHG | HEART RATE: 80 BPM | WEIGHT: 289 LBS | HEIGHT: 67 IN | OXYGEN SATURATION: 97 %

## 2022-06-29 DIAGNOSIS — Z11.4 SCREENING FOR HIV (HUMAN IMMUNODEFICIENCY VIRUS): ICD-10-CM

## 2022-06-29 DIAGNOSIS — Z00.01 ENCOUNTER FOR GENERAL ADULT MEDICAL EXAMINATION WITH ABNORMAL FINDINGS: Primary | ICD-10-CM

## 2022-06-29 DIAGNOSIS — E66.01 CLASS 3 SEVERE OBESITY DUE TO EXCESS CALORIES WITH SERIOUS COMORBIDITY AND BODY MASS INDEX (BMI) OF 45.0 TO 49.9 IN ADULT (HCC): ICD-10-CM

## 2022-06-29 DIAGNOSIS — K59.04 CHRONIC IDIOPATHIC CONSTIPATION: ICD-10-CM

## 2022-06-29 DIAGNOSIS — Z11.59 NEED FOR HEPATITIS C SCREENING TEST: ICD-10-CM

## 2022-06-29 DIAGNOSIS — K64.9 HEMORRHOIDS, UNSPECIFIED HEMORRHOID TYPE: ICD-10-CM

## 2022-06-29 DIAGNOSIS — Z13.1 DIABETES MELLITUS SCREENING: ICD-10-CM

## 2022-06-29 DIAGNOSIS — E66.01 MORBID OBESITY (HCC): ICD-10-CM

## 2022-06-29 PROCEDURE — 99395 PREV VISIT EST AGE 18-39: CPT | Performed by: FAMILY MEDICINE

## 2022-06-29 RX ORDER — HYDROCORTISONE 25 MG/G
CREAM TOPICAL 2 TIMES DAILY
Qty: 28 G | Refills: 0 | Status: SHIPPED | OUTPATIENT
Start: 2022-06-29 | End: 2022-07-06

## 2022-06-29 RX ORDER — SENNA AND DOCUSATE SODIUM 50; 8.6 MG/1; MG/1
1 TABLET, FILM COATED ORAL 2 TIMES DAILY PRN
Qty: 20 TABLET | Refills: 0 | Status: SHIPPED | OUTPATIENT
Start: 2022-06-29

## 2022-06-29 RX ORDER — POLYETHYLENE GLYCOL 3350 17 G/17G
17 POWDER, FOR SOLUTION ORAL DAILY
Qty: 507 G | Refills: 0 | Status: SHIPPED | OUTPATIENT
Start: 2022-06-29

## 2022-06-29 RX ORDER — LORATADINE 10 MG/1
10 TABLET ORAL DAILY
COMMUNITY

## 2022-06-29 NOTE — PATIENT INSTRUCTIONS

## 2022-06-29 NOTE — PROGRESS NOTES
[facility]    NAME: Jovanny Cordero  AGE: 31 y o  SEX: male  : 1985     DATE: 2022     Assessment and Plan:     Problem List Items Addressed This Visit        Other    Morbid obesity ([address] )      Other Visit Diagnoses     Annual physical exam    -  Primary    Relevant Orders    CBC and differential    Comprehensive metabolic panel    Lipid panel    TSH, 3rd generation with Free T4 reflex    Need for hepatitis C screening test        Screening for HIV (human immunodeficiency virus)        Chronic idiopathic constipation        Relevant Medications    senna-docusate sodium (SENOKOT-S) 8 6-50 mg per tablet    polyethylene glycol (GLYCOLAX) 17 GM/SCOOP powder    Diabetes mellitus screening        Relevant Orders    Hemoglobin A1C    Hemorrhoids, unspecified hemorrhoid type        Relevant Medications    hydrocortisone (ANUSOL-HC) 2 5 % rectal cream    benzocaine (AMERICAINE) 20 % rectal ointment    Other Relevant Orders    Ambulatory Referral to Gastroenterology        Suspicion for thrombosis at this time, will refer GI  Pt was reassured and given a script for topical HC and benzocaine for symptomatic relief  Counseling was provided for Fiber and fluid intake and avoiding constipation  Instructed to return if experiences worsening pain or profuse bleeding  Discussed pathophysiology of constipation with patient  Increase fluid intake, primarily water  Increase daily dietary fiber (fruits and vegetables), may use OTC fiber source to supplement diet  Increase daily activity which can help stimulate bowel movements  Set aside designated time following meals to attempt to move bowels and do not ignore the urge to have a bowel movement (bowel retraining)  Avoid OTC laxatives and colon cleanses preparations which can worsen constipation          Immunizations and preventive care screenings were discussed with patient today  Appropriate education was printed on patient's after visit summary  Counseling:  Alcohol/drug use: discussed moderation in alcohol intake, the recommendations for healthy alcohol use, and avoidance of illicit drug use  Dental Health: discussed importance of regular tooth brushing, flossing, and dental visits  Injury prevention: discussed safety/seat belts, safety helmets, smoke detectors, carbon dioxide detectors, and smoking near bedding or upholstery  Sexual health: discussed sexually transmitted diseases, partner selection, use of condoms, avoidance of unintended pregnancy, and contraceptive alternatives  · Exercise: the importance of regular exercise/physical activity was discussed  Recommend exercise 3-5 times per week for at least 30 minutes  No follow-ups on file  Chief Complaint:     Chief Complaint   Patient presents with    Physical Exam      History of Present Illness:     Adult Annual Physical   Patient here for a comprehensive physical exam  The patient reports problems - hemorrhoids painful and bleeding, chronic constipation for 10 years  Diet and Physical Activity  · Diet/Nutrition: poor diet, frequent junk food and high fat diet  · Exercise: no formal exercise  Depression Screening  PHQ-2/9 Depression Screening    Little interest or pleasure in doing things: 0 - not at all  Feeling down, depressed, or hopeless: 0 - not at all  PHQ-2 Score: 0  PHQ-2 Interpretation: Negative depression screen       General Health  · Sleep: sleeps well and sleeps poorly  · Hearing: grossly normal   · Vision: no vision problems and goes for regular eye exams  · Dental: regular dental visits and brushes teeth twice daily   Health  · History of STDs?: no      Review of Systems:     Review of Systems   Constitutional: Negative for chills and fever  HENT: Negative for congestion, rhinorrhea and sore throat  Eyes: Negative for discharge, redness and itching  Respiratory: Negative for chest tightness, shortness of breath and wheezing  Cardiovascular: Negative for chest pain and palpitations  Gastrointestinal: Negative for abdominal pain, constipation and diarrhea  Genitourinary: Negative for dysuria  Skin: Negative for pallor and rash  Neurological: Negative for dizziness, weakness, numbness and headaches  Past Medical History:     History reviewed  No pertinent past medical history  Past Surgical History:     History reviewed  No pertinent surgical history     Social History:     Social History     Socioeconomic History    Marital status: /Civil Union     Spouse name: None    Number of children: None    Years of education: None    Highest education level: None   Occupational History    None   Tobacco Use    Smoking status: Never Smoker    Smokeless tobacco: Never Used   Vaping Use    Vaping Use: Never used   Substance and Sexual Activity    Alcohol use: Not Currently     Comment: rare    Drug use: Never    Sexual activity: None   Other Topics Concern    None   Social History Narrative    None     Social Determinants of Health     Financial Resource Strain: Not on file   Food Insecurity: Not on file   Transportation Needs: Not on file   Physical Activity: Not on file   Stress: Not on file   Social Connections: Not on file   Intimate Partner Violence: Not on file   Housing Stability: Not on file      Family History:     Family History   Problem Relation Age of Onset    Hypertension Mother     Stroke Father     No Known Problems Sister     No Known Problems Brother     No Known Problems Maternal Aunt     No Known Problems Maternal Uncle     No Known Problems Paternal Aunt     No Known Problems Paternal Uncle     No Known Problems Maternal Grandmother     No Known Problems Maternal Grandfather     No Known Problems Paternal Grandmother     No Known Problems Paternal Grandfather       Current Medications:     Current Outpatient Medications   Medication Sig Dispense Refill    benzocaine (AMERICAINE) 20 % rectal ointment Insert into the rectum every 3 (three) hours as needed for itching or hemorrhoids 28 4 g 0    hydrocortisone (ANUSOL-HC) 2 5 % rectal cream Apply topically 2 (two) times a day for 7 days 28 g 0    loratadine (CLARITIN) 10 mg tablet Take 10 mg by mouth daily      polyethylene glycol (GLYCOLAX) 17 GM/SCOOP powder Take 17 g by mouth daily 507 g 0    senna-docusate sodium (SENOKOT-S) 8 6-50 mg per tablet Take 1 tablet by mouth 2 (two) times a day as needed for constipation 20 tablet 0     No current facility-administered medications for this visit  Allergies:     No Known Allergies   Physical Exam:     /76 (BP Location: Left arm, Patient Position: Sitting, Cuff Size: Large)   Pulse 80   Temp (!) 97 3 °F (36 3 °C) (Temporal)   Resp 18   Ht 5' 7" (1 702 m)   Wt 131 kg (289 lb)   SpO2 97%   BMI 45 26 kg/m²     Physical Exam  Vitals and nursing note reviewed  Constitutional:       General: He is not in acute distress  Appearance: Normal appearance  He is well-developed  He is obese  He is not ill-appearing or toxic-appearing  HENT:      Head: Normocephalic and atraumatic  Right Ear: Tympanic membrane, ear canal and external ear normal       Left Ear: Tympanic membrane, ear canal and external ear normal       Nose: No mucosal edema or rhinorrhea  Mouth/Throat:      Mouth: Mucous membranes are not pale, dry and not cyanotic  Pharynx: Oropharynx is clear  No oropharyngeal exudate or posterior oropharyngeal erythema  Eyes:      General: No scleral icterus  Right eye: No discharge  Left eye: No discharge  Extraocular Movements: Extraocular movements intact  Conjunctiva/sclera: Conjunctivae normal       Pupils: Pupils are equal, round, and reactive to light  Cardiovascular:      Rate and Rhythm: Normal rate and regular rhythm        Heart sounds: Normal heart sounds  No murmur heard  No gallop  Pulmonary:      Effort: Pulmonary effort is normal  No respiratory distress  Breath sounds: Normal breath sounds  No wheezing or rales  Abdominal:      General: Abdomen is flat  Palpations: Abdomen is soft  Tenderness: There is no abdominal tenderness  Musculoskeletal:         General: No swelling, tenderness, deformity or signs of injury  Cervical back: Normal range of motion  Right lower leg: No edema  Left lower leg: No edema  Skin:     General: Skin is warm  Coloration: Skin is not jaundiced or pale  Findings: No rash  Neurological:      General: No focal deficit present  Mental Status: He is alert and oriented to person, place, and time  Psychiatric:         Mood and Affect: Mood normal          Behavior: Behavior normal          Thought Content:  Thought content normal          Judgment: Judgment normal           Jimena Jimenez MD   [address] [place] [address]

## 2022-07-06 ENCOUNTER — TELEPHONE (OUTPATIENT)
Dept: GASTROENTEROLOGY | Facility: CLINIC | Age: 37
End: 2022-07-06

## 2022-07-06 ENCOUNTER — CONSULT (OUTPATIENT)
Dept: GASTROENTEROLOGY | Facility: CLINIC | Age: 37
End: 2022-07-06

## 2022-07-06 ENCOUNTER — TELEPHONE (OUTPATIENT)
Dept: GASTROENTEROLOGY | Facility: HOSPITAL | Age: 37
End: 2022-07-06

## 2022-07-06 VITALS
DIASTOLIC BLOOD PRESSURE: 72 MMHG | HEIGHT: 67 IN | SYSTOLIC BLOOD PRESSURE: 106 MMHG | HEART RATE: 96 BPM | WEIGHT: 289.4 LBS | BODY MASS INDEX: 45.42 KG/M2

## 2022-07-06 DIAGNOSIS — K59.00 CONSTIPATION, UNSPECIFIED CONSTIPATION TYPE: ICD-10-CM

## 2022-07-06 DIAGNOSIS — K64.9 HEMORRHOIDS, UNSPECIFIED HEMORRHOID TYPE: Primary | ICD-10-CM

## 2022-07-06 DIAGNOSIS — K62.5 BRBPR (BRIGHT RED BLOOD PER RECTUM): ICD-10-CM

## 2022-07-06 PROCEDURE — 99203 OFFICE O/P NEW LOW 30 MIN: CPT | Performed by: NURSE PRACTITIONER

## 2022-07-06 RX ORDER — HYDROCORTISONE ACETATE 25 MG/1
25 SUPPOSITORY RECTAL 2 TIMES DAILY
Qty: 28 SUPPOSITORY | Refills: 1 | Status: SHIPPED | OUTPATIENT
Start: 2022-07-06 | End: 2022-08-14

## 2022-07-06 NOTE — H&P (VIEW-ONLY)
[address] [facility] - Outpatient Consultation  Alek Ware 31 y o  male MRN: [id number]  Encounter: [id number]          ASSESSMENT AND PLAN:      1  Hemorrhoids, unspecified hemorrhoid type  Patient reports he does have internal and external hemorrhoids  Patient would like hemorrhoidal banding done   -Schedule for hemorrhoidal banding  - hydrocortisone (ANUSOL-HC) 25 mg suppository; Insert 1 suppository (25 mg total) into the rectum 2 (two) times a day for 14 days  Dispense: 28 suppository; Refill: 1  -Sitz bath    2  Constipation, unspecified constipation type  3  BRBPR (bright red blood per rectum)  Patient has history of chronic constipation for approximately 10 years  Patient denies any blood in stool or blood in toilet  Patient does report blood when he wipes  Patient recently started on Senokot and MiraLax by primary care physician  Light red blood from rectal area may be secondary to hemorrhoids, fissure, ulceration, or lesion   -Continue Senokot as ordered by PCP  -Advised patient to take MiraLax 1 cap full daily in 8 ounces of on carbonated beverage in may adjust MiraLax for bowel movements  - Colonoscopy; schedule for colonoscopy for further evaluation of bright red blood from rectal area  High-fiber diet  ______________________________________________________________________    HPI: Alek Ware is a 74-year-old male who presents to office with report hemorrhoids, constipation, and bright red blood from rectal area  Patient has history of chronic constipation for approximately 10 years  Patient denies any blood in stool or blood in toilet  Patient does report blood when he wipes  Patient recently started on Senokot and MiraLax by primary care physician  Patient reports he does have internal and external hemorrhoids  Patient reports he was recently given external creams by his PCP but has not started using creams at present time    Patient does report rectal discomfort from the hemorrhoids  Patient denies nausea, vomiting, acid reflux, heartburn, dysphagia, epigastric or abdominal pain  Patient denies black tarry stool  No change in weight  Patient does not smoke  Patient does not drink alcohol  No family history of gastric or colon cancer  Patient never had a colonoscopy or EGD  REVIEW OF SYSTEMS:    CONSTITUTIONAL: Denies any fever, chills, rigors, and weight loss  HEENT: No earache or tinnitus  Denies hearing loss or visual disturbances  CARDIOVASCULAR: No chest pain or palpitations  RESPIRATORY: Denies any cough, hemoptysis, shortness of breath or dyspnea on exertion  GASTROINTESTINAL: As noted in the History of Present Illness  GENITOURINARY: No problems with urination  Denies any hematuria or dysuria  NEUROLOGIC: No dizziness or vertigo, denies headaches  MUSCULOSKELETAL: Denies any muscle or joint pain  SKIN: Denies skin rashes or itching  ENDOCRINE: Denies excessive thirst  Denies intolerance to heat or cold  PSYCHOSOCIAL: Denies depression or anxiety  Denies any recent memory loss  Historical Information   History reviewed  No pertinent past medical history  History reviewed  No pertinent surgical history    Social History   Social History     Substance and Sexual Activity   Alcohol Use Not Currently    Comment: rare     Social History     Substance and Sexual Activity   Drug Use Never     Social History     Tobacco Use   Smoking Status Never Smoker   Smokeless Tobacco Never Used     Family History   Problem Relation Age of Onset    Hypertension Mother     Stroke Father     No Known Problems Sister     No Known Problems Brother     No Known Problems Maternal Aunt     No Known Problems Maternal Uncle     No Known Problems Paternal Aunt     No Known Problems Paternal Uncle     No Known Problems Maternal Grandmother     No Known Problems Maternal Grandfather     No Known Problems Paternal Grandmother     No Known Problems Paternal Grandfather        Meds/Allergies       Current Outpatient Medications:     hydrocortisone (ANUSOL-HC) 25 mg suppository    loratadine (CLARITIN) 10 mg tablet    polyethylene glycol (GLYCOLAX) 17 GM/SCOOP powder    benzocaine (AMERICAINE) 20 % rectal ointment    hydrocortisone (ANUSOL-HC) 2 5 % rectal cream    senna-docusate sodium (SENOKOT-S) 8 6-50 mg per tablet    No Known Allergies        Objective     Blood pressure 106/72, pulse 96, height 5' 7" (1 702 m), weight 131 kg (289 lb 6 4 oz)  Body mass index is 45 33 kg/m²  PHYSICAL EXAM:      General Appearance:   Alert, cooperative, no distress   HEENT:   Normocephalic, atraumatic, anicteric      Neck:  Supple, symmetrical, trachea midline   Lungs:   Clear to auscultation bilaterally; no rales, rhonchi or wheezing; respirations unlabored    Heart[de-identified]   Regular rate and rhythm; no murmur, rub, or gallop  Abdomen:   Soft, non-tender, non-distended; normal bowel sounds; no masses, no organomegaly    Genitalia:   Deferred    Rectal:   Deferred    Extremities:  No cyanosis, clubbing or edema    Pulses:  2+ and symmetric    Skin:  No jaundice, rashes, or lesions    Lymph nodes:  No palpable cervical lymphadenopathy        Lab Results:   No visits with results within 1 Day(s) from this visit     Latest known visit with results is:   Admission on 01/21/2021, Discharged on 01/21/2021   Component Date Value    WBC 01/21/2021 11 39 (A)    RBC 01/21/2021 4 70     Hemoglobin 01/21/2021 14 1     Hematocrit 01/21/2021 41 3     MCV 01/21/2021 88     MCH 01/21/2021 30 0     MCHC 01/21/2021 34 1     RDW 01/21/2021 13 3     MPV 01/21/2021 9 5     Platelets 29/56/5006 302     Neutrophils Relative 01/21/2021 50     Immat GRANS % 01/21/2021 1     Lymphocytes Relative 01/21/2021 36     Monocytes Relative 01/21/2021 9     Eosinophils Relative 01/21/2021 3     Basophils Relative 01/21/2021 1     Neutrophils Absolute 01/21/2021 5 88     Immature Grans Absolute 01/21/2021 0 06     Lymphocytes Absolute 01/21/2021 4 08     Monocytes Absolute 01/21/2021 1 00     Eosinophils Absolute 01/21/2021 0 31     Basophils Absolute 01/21/2021 0 06     Sodium 01/21/2021 140     Potassium 01/21/2021 3 8     Chloride 01/21/2021 101     CO2 01/21/2021 31     ANION GAP 01/21/2021 8     BUN 01/21/2021 14     Creatinine 01/21/2021 0 93     Glucose 01/21/2021 105     Calcium 01/21/2021 10 2     AST 01/21/2021 18     ALT 01/21/2021 23     Alkaline Phosphatase 01/21/2021 85 8     Total Protein 01/21/2021 7 7     Albumin 01/21/2021 4 6     Total Bilirubin 01/21/2021 0 40     eGFR 01/21/2021 107     Troponin I 01/21/2021 <0 03     Lipase 01/21/2021 11 (A)    D-Dimer, Quant  01/21/2021 0 21     Ventricular Rate 01/21/2021 81     Atrial Rate 01/21/2021 82     CO Interval 01/21/2021 147     QRSD Interval 01/21/2021 98     QT Interval 01/21/2021 360     QTC Interval 01/21/2021 418     P Axis 01/21/2021 4     QRS Axis 01/21/2021 -23     T Wave Axis 01/21/2021 37          Radiology Results:   No results found

## 2022-07-06 NOTE — PATIENT INSTRUCTIONS
High-fiber diet  MiraLax 1 cap full daily in 8 oz of noncarbonated beverage may adjust the bowel movement

## 2022-07-12 ENCOUNTER — ANESTHESIA EVENT (OUTPATIENT)
Dept: GASTROENTEROLOGY | Facility: AMBULATORY SURGERY CENTER | Age: 37
End: 2022-07-12

## 2022-07-12 ENCOUNTER — ANESTHESIA (OUTPATIENT)
Dept: GASTROENTEROLOGY | Facility: AMBULATORY SURGERY CENTER | Age: 37
End: 2022-07-12

## 2022-07-12 ENCOUNTER — HOSPITAL ENCOUNTER (OUTPATIENT)
Dept: GASTROENTEROLOGY | Facility: AMBULATORY SURGERY CENTER | Age: 37
Discharge: HOME | End: 2022-07-12

## 2022-07-12 VITALS
WEIGHT: 289 LBS | RESPIRATION RATE: 18 BRPM | HEIGHT: 67 IN | HEART RATE: 75 BPM | SYSTOLIC BLOOD PRESSURE: 116 MMHG | DIASTOLIC BLOOD PRESSURE: 75 MMHG | BODY MASS INDEX: 45.36 KG/M2 | OXYGEN SATURATION: 96 % | TEMPERATURE: 96.9 F

## 2022-07-12 DIAGNOSIS — K64.9 HEMORRHOIDS, UNSPECIFIED HEMORRHOID TYPE: ICD-10-CM

## 2022-07-12 DIAGNOSIS — K59.00 CONSTIPATION, UNSPECIFIED CONSTIPATION TYPE: ICD-10-CM

## 2022-07-12 DIAGNOSIS — K62.5 BRBPR (BRIGHT RED BLOOD PER RECTUM): ICD-10-CM

## 2022-07-12 PROCEDURE — 45378 DIAGNOSTIC COLONOSCOPY: CPT | Performed by: INTERNAL MEDICINE

## 2022-07-12 RX ORDER — SODIUM CHLORIDE, SODIUM LACTATE, POTASSIUM CHLORIDE, CALCIUM CHLORIDE 600; 310; 30; 20 MG/100ML; MG/100ML; MG/100ML; MG/100ML
20 INJECTION, SOLUTION INTRAVENOUS CONTINUOUS
Status: DISCONTINUED | OUTPATIENT
Start: 2022-07-12 | End: 2022-07-16 | Stop reason: HOSPADM

## 2022-07-12 RX ORDER — SODIUM CHLORIDE 9 MG/ML
INJECTION, SOLUTION INTRAVENOUS CONTINUOUS PRN
Status: DISCONTINUED | OUTPATIENT
Start: 2022-07-12 | End: 2022-07-12

## 2022-07-12 RX ORDER — BIOTIN 10 MG
TABLET ORAL
COMMUNITY

## 2022-07-12 RX ORDER — LIDOCAINE HYDROCHLORIDE 10 MG/ML
INJECTION, SOLUTION EPIDURAL; INFILTRATION; INTRACAUDAL; PERINEURAL AS NEEDED
Status: DISCONTINUED | OUTPATIENT
Start: 2022-07-12 | End: 2022-07-12

## 2022-07-12 RX ORDER — PROPOFOL 10 MG/ML
INJECTION, EMULSION INTRAVENOUS AS NEEDED
Status: DISCONTINUED | OUTPATIENT
Start: 2022-07-12 | End: 2022-07-12

## 2022-07-12 RX ADMIN — SODIUM CHLORIDE: 9 INJECTION, SOLUTION INTRAVENOUS at 10:23

## 2022-07-12 RX ADMIN — PROPOFOL 50 MG: 10 INJECTION, EMULSION INTRAVENOUS at 10:36

## 2022-07-12 RX ADMIN — PROPOFOL 20 MG: 10 INJECTION, EMULSION INTRAVENOUS at 10:43

## 2022-07-12 RX ADMIN — PROPOFOL 30 MG: 10 INJECTION, EMULSION INTRAVENOUS at 10:39

## 2022-07-12 RX ADMIN — LIDOCAINE HYDROCHLORIDE 30 MG: 10 INJECTION, SOLUTION EPIDURAL; INFILTRATION; INTRACAUDAL; PERINEURAL at 10:32

## 2022-07-12 RX ADMIN — PROPOFOL 50 MG: 10 INJECTION, EMULSION INTRAVENOUS at 10:34

## 2022-07-12 RX ADMIN — PROPOFOL 100 MG: 10 INJECTION, EMULSION INTRAVENOUS at 10:32

## 2022-07-12 NOTE — ANESTHESIA POSTPROCEDURE EVALUATION
Post-Op Assessment Note    CV Status:  Stable  Pain Score: 0    Pain management: adequate     Mental Status:  Alert, awake and arousable   Hydration Status:  Euvolemic   PONV Controlled:  Controlled   Airway Patency:  Patent      Post Op Vitals Reviewed: Yes      Staff: CRNA         No complications documented      BP   100/66   Temp     Pulse  83   Resp   16   SpO2   95

## 2022-07-12 NOTE — ANESTHESIA PREPROCEDURE EVALUATION
Procedure:  COLONOSCOPY    Relevant Problems   CARDIO   (+) Atypical chest pain   (+) Precordial pain      GI/HEPATIC   (+) BRBPR (bright red blood per rectum)   (+) Gastroesophageal reflux disease without esophagitis        Physical Exam    Airway    Mallampati score: I  TM Distance: >3 FB  Neck ROM: full     Dental   No notable dental hx     Cardiovascular      Pulmonary      Other Findings  Morbidly obese male      Anesthesia Plan  ASA Score- 3     Anesthesia Type- IV sedation with anesthesia with ASA Monitors  Additional Monitors:   Airway Plan:           Plan Factors-Exercise tolerance (METS): >4 METS  Chart reviewed  Patient summary reviewed  Patient is not a current smoker  Obstructive sleep apnea risk education given perioperatively  Induction-     Postoperative Plan-     Informed Consent- Anesthetic plan and risks discussed with patient  I personally reviewed this patient with the CRNA  Discussed and agreed on the Anesthesia Plan with the CRNA  Shaniqua Trent

## 2022-07-12 NOTE — INTERVAL H&P NOTE
H&P reviewed  After examining the patient I find no changes in the patients condition since the H&P had been written      Vitals:    07/12/22 1018   BP: 122/85   Pulse: 89   Resp: 18   Temp: (!) 96 9 °F (36 1 °C)   SpO2: 96%

## 2022-07-20 ENCOUNTER — APPOINTMENT (EMERGENCY)
Dept: CT IMAGING | Facility: HOSPITAL | Age: 37
End: 2022-07-20
Payer: COMMERCIAL

## 2022-07-20 ENCOUNTER — HOSPITAL ENCOUNTER (EMERGENCY)
Facility: HOSPITAL | Age: 37
Discharge: HOME | End: 2022-07-20
Attending: EMERGENCY MEDICINE
Payer: COMMERCIAL

## 2022-07-20 VITALS
RESPIRATION RATE: 16 BRPM | DIASTOLIC BLOOD PRESSURE: 92 MMHG | TEMPERATURE: 98 F | SYSTOLIC BLOOD PRESSURE: 128 MMHG | OXYGEN SATURATION: 97 % | HEART RATE: 96 BPM

## 2022-07-20 DIAGNOSIS — G51.0 BELL'S PALSY: Primary | ICD-10-CM

## 2022-07-20 LAB
ANION GAP SERPL CALCULATED.3IONS-SCNC: 8 MMOL/L (ref 4–13)
BASOPHILS # BLD AUTO: 0.06 THOUSANDS/ΜL (ref 0–0.1)
BASOPHILS NFR BLD AUTO: 1 % (ref 0–1)
BUN SERPL-MCNC: 15 MG/DL (ref 5–25)
CALCIUM SERPL-MCNC: 10.2 MG/DL (ref 8.4–10.2)
CHLORIDE SERPL-SCNC: 100 MMOL/L (ref 96–108)
CO2 SERPL-SCNC: 29 MMOL/L (ref 21–32)
CREAT SERPL-MCNC: 0.93 MG/DL (ref 0.6–1.3)
EOSINOPHIL # BLD AUTO: 0.3 THOUSAND/ΜL (ref 0–0.61)
EOSINOPHIL NFR BLD AUTO: 3 % (ref 0–6)
ERYTHROCYTE [DISTWIDTH] IN BLOOD BY AUTOMATED COUNT: 13.2 % (ref 11.6–15.1)
GFR SERPL CREATININE-BSD FRML MDRD: 106 ML/MIN/1.73SQ M
GLUCOSE SERPL-MCNC: 96 MG/DL (ref 65–140)
HCT VFR BLD AUTO: 43.1 % (ref 36.5–49.3)
HGB BLD-MCNC: 14.6 G/DL (ref 12–17)
IMM GRANULOCYTES # BLD AUTO: 0.08 THOUSAND/UL (ref 0–0.2)
IMM GRANULOCYTES NFR BLD AUTO: 1 % (ref 0–2)
LYMPHOCYTES # BLD AUTO: 2.44 THOUSANDS/ΜL (ref 0.6–4.47)
LYMPHOCYTES NFR BLD AUTO: 27 % (ref 14–44)
MCH RBC QN AUTO: 29.6 PG (ref 26.8–34.3)
MCHC RBC AUTO-ENTMCNC: 33.9 G/DL (ref 31.4–37.4)
MCV RBC AUTO: 87 FL (ref 82–98)
MONOCYTES # BLD AUTO: 0.71 THOUSAND/ΜL (ref 0.17–1.22)
MONOCYTES NFR BLD AUTO: 8 % (ref 4–12)
NEUTROPHILS # BLD AUTO: 5.56 THOUSANDS/ΜL (ref 1.85–7.62)
NEUTS SEG NFR BLD AUTO: 60 % (ref 43–75)
NRBC BLD AUTO-RTO: 0 /100 WBCS
PLATELET # BLD AUTO: 315 THOUSANDS/UL (ref 149–390)
PMV BLD AUTO: 9.2 FL (ref 8.9–12.7)
POTASSIUM SERPL-SCNC: 4.1 MMOL/L (ref 3.5–5.3)
RBC # BLD AUTO: 4.94 MILLION/UL (ref 3.88–5.62)
SODIUM SERPL-SCNC: 137 MMOL/L (ref 135–147)
WBC # BLD AUTO: 9.15 THOUSAND/UL (ref 4.31–10.16)

## 2022-07-20 PROCEDURE — 96374 THER/PROPH/DIAG INJ IV PUSH: CPT

## 2022-07-20 PROCEDURE — 36415 COLL VENOUS BLD VENIPUNCTURE: CPT | Performed by: EMERGENCY MEDICINE

## 2022-07-20 PROCEDURE — 99284 EMERGENCY DEPT VISIT MOD MDM: CPT | Performed by: EMERGENCY MEDICINE

## 2022-07-20 PROCEDURE — G1004 CDSM NDSC: HCPCS

## 2022-07-20 PROCEDURE — 80048 BASIC METABOLIC PNL TOTAL CA: CPT | Performed by: EMERGENCY MEDICINE

## 2022-07-20 PROCEDURE — 70450 CT HEAD/BRAIN W/O DYE: CPT

## 2022-07-20 PROCEDURE — 86618 LYME DISEASE ANTIBODY: CPT | Performed by: EMERGENCY MEDICINE

## 2022-07-20 PROCEDURE — 85025 COMPLETE CBC W/AUTO DIFF WBC: CPT | Performed by: EMERGENCY MEDICINE

## 2022-07-20 PROCEDURE — 99284 EMERGENCY DEPT VISIT MOD MDM: CPT

## 2022-07-20 RX ORDER — PREDNISONE 10 MG/1
40 TABLET ORAL DAILY
Qty: 16 TABLET | Refills: 0 | Status: SHIPPED | OUTPATIENT
Start: 2022-07-21 | End: 2022-07-20 | Stop reason: SDUPTHER

## 2022-07-20 RX ORDER — ACYCLOVIR 200 MG/1
400 CAPSULE ORAL ONCE
Status: COMPLETED | OUTPATIENT
Start: 2022-07-20 | End: 2022-07-20

## 2022-07-20 RX ORDER — METHYLPREDNISOLONE SODIUM SUCCINATE 125 MG/2ML
125 INJECTION, POWDER, LYOPHILIZED, FOR SOLUTION INTRAMUSCULAR; INTRAVENOUS ONCE
Status: COMPLETED | OUTPATIENT
Start: 2022-07-20 | End: 2022-07-20

## 2022-07-20 RX ORDER — PREDNISONE 10 MG/1
40 TABLET ORAL DAILY
Qty: 16 TABLET | Refills: 0 | Status: SHIPPED | OUTPATIENT
Start: 2022-07-21 | End: 2022-07-25

## 2022-07-20 RX ORDER — ACYCLOVIR 400 MG/1
400 TABLET ORAL
Qty: 35 TABLET | Refills: 0 | Status: SHIPPED | OUTPATIENT
Start: 2022-07-20 | End: 2022-08-14

## 2022-07-20 RX ADMIN — ACYCLOVIR 400 MG: 200 CAPSULE ORAL at 15:20

## 2022-07-20 RX ADMIN — METHYLPREDNISOLONE SODIUM SUCCINATE 125 MG: 125 INJECTION, POWDER, FOR SOLUTION INTRAMUSCULAR; INTRAVENOUS at 15:25

## 2022-07-20 NOTE — ED NOTES
Patient transported to [address] [place] [address] , [facility]  07/20/22 7306

## 2022-07-20 NOTE — ED PROVIDER NOTES
History  Chief Complaint   Patient presents with    Eye Problem     Pt reports he has been having an ongoing issue with his R eye (dryness, irritation ) and R sided facial numbness  Was using OTC eyedrops without relief  Saw an optometrist x3 days ago and was prescribed new eyedrops  Stated they were helping, but now he feels as though he is having increasing facial weakness getting worse each day and numbness has not gotten better  30-year-old male comes in for evaluation of facial droop  Patient states approximate for 5 days ago he began having right eye dryness and irritation  He then noticed that the right side of his face felt numb  Patient states he thought it had to do with his ice who went to his optometrist who provided eyedrops  Patient is concerned because he feels like he may have had a stroke  Patient has multiple family members who have had strokes including both his parents  Patient denies any other focal neurological deficit  Patient has no risk factors other than family history for stroke  On my exam he does have a right-sided facial droop that includes his eyebrow  Most likely secondary to a Bell's palsy  Patient denies any traumatic injury he has not seen any tick bite but he does say he fell asleep in a [place] outside several days before the facial droop began    Patient denies any previous similar episodes      History provided by:  Patient and spouse   used: No    CVA/TIA-like Symptoms  Presenting symptoms: sensory loss and weakness    Presenting symptoms: no confusion and no headaches    Sensory loss:     Location:  R facial    Severity:  Moderate  Weakness:     Severity:  Mild    Onset quality:  Sudden    Timing:  Constant    Progression:  Unchanged    Chronicity:  New  Date/time of last known well:  7/15/2022 8:00 PM  Onset quality:  Sudden  Timing:  Constant  Progression:  Unchanged  Similar to previous episodes: no    Associated symptoms: no chest pain, no dizziness, no facial pain, no fever, no hearing loss, no neck pain, no seizures, no tinnitus and no vertigo        Prior to Admission Medications   Prescriptions Last Dose Informant Patient Reported? Taking? Multiple Vitamins-Minerals (Multivitamin Adult) CHEW  Self Yes No   Sig: Chew   benzocaine (AMERICAINE) 20 % rectal ointment  Self No No   Sig: Insert into the rectum every 3 (three) hours as needed for itching or hemorrhoids   Patient not taking: Reported on 7/6/2022   hydrocortisone (ANUSOL-HC) 2 5 % rectal cream  Self No No   Sig: Apply topically 2 (two) times a day for 7 days   Patient not taking: Reported on 7/6/2022   hydrocortisone (ANUSOL-HC) 25 mg suppository   No No   Sig: Insert 1 suppository (25 mg total) into the rectum 2 (two) times a day for 14 days   loratadine (CLARITIN) 10 mg tablet  Self Yes No   Sig: Take 10 mg by mouth daily   polyethylene glycol (GLYCOLAX) 17 GM/SCOOP powder  Self No No   Sig: Take 17 g by mouth daily   senna-docusate sodium (SENOKOT-S) 8 6-50 mg per tablet  Self No No   Sig: Take 1 tablet by mouth 2 (two) times a day as needed for constipation   Patient not taking: Reported on 7/6/2022      Facility-Administered Medications: None       Past Medical History:   Diagnosis Date    Constipation     Hemorrhoids     Seasonal allergies        Past Surgical History:   Procedure Laterality Date    NO PAST SURGERIES         Family History   Problem Relation Age of Onset    Hypertension Mother     Stroke Father     No Known Problems Sister     No Known Problems Brother     No Known Problems Maternal Aunt     No Known Problems Maternal Uncle     No Known Problems Paternal Aunt     No Known Problems Paternal Uncle     No Known Problems Maternal Grandmother     No Known Problems Maternal Grandfather     No Known Problems Paternal Grandmother     No Known Problems Paternal Grandfather      I have reviewed and agree with the history as documented      E-Cigarette/Vaping  E-Cigarette Use Never User      E-Cigarette/Vaping Substances    Nicotine No     THC No     CBD No     Flavoring No     Other No     Unknown No      Social History     Tobacco Use    Smoking status: Never Smoker    Smokeless tobacco: Never Used   Vaping Use    Vaping Use: Never used   Substance Use Topics    Alcohol use: Not Currently     Comment: rare    Drug use: Never       Review of Systems   Constitutional: Negative for activity change, appetite change and fever  HENT: Negative for congestion, facial swelling, hearing loss and tinnitus  Eyes: Negative for discharge and redness  Respiratory: Negative for cough and wheezing  Cardiovascular: Negative for chest pain and leg swelling  Gastrointestinal: Negative for abdominal distention, abdominal pain and blood in stool  Endocrine: Negative for cold intolerance and polydipsia  Genitourinary: Negative for difficulty urinating and hematuria  Musculoskeletal: Negative for arthralgias, gait problem and neck pain  Skin: Negative for color change and rash  Allergic/Immunologic: Negative for food allergies and immunocompromised state  Neurological: Positive for facial asymmetry, weakness and numbness  Negative for dizziness, vertigo, seizures and headaches  Hematological: Negative for adenopathy  Does not bruise/bleed easily  Psychiatric/Behavioral: Negative for agitation, confusion and decreased concentration  All other systems reviewed and are negative  Physical Exam  Physical Exam  Vitals and nursing note reviewed  Constitutional:       Appearance: He is well-developed  He is not toxic-appearing  HENT:      Head: Normocephalic and atraumatic  Right Ear: Tympanic membrane normal       Left Ear: Tympanic membrane normal       Nose: Nose normal    Eyes:      General: Lids are normal       Conjunctiva/sclera: Conjunctivae normal       Pupils: Pupils are equal, round, and reactive to light     Neck:      Vascular: No carotid bruit or JVD  Trachea: Trachea normal    Cardiovascular:      Rate and Rhythm: Normal rate and regular rhythm  No extrasystoles are present  Heart sounds: Normal heart sounds  Pulmonary:      Effort: Pulmonary effort is normal       Breath sounds: No decreased breath sounds, wheezing, rhonchi or rales  Chest:      Chest wall: No deformity or tenderness  Abdominal:      General: Bowel sounds are normal       Palpations: Abdomen is soft  Tenderness: There is no abdominal tenderness  There is no guarding or rebound  Musculoskeletal:      Right shoulder: No swelling, deformity or tenderness  Normal range of motion  Cervical back: Normal range of motion and neck supple  No deformity, tenderness or bony tenderness  Lymphadenopathy:      Cervical: No cervical adenopathy  Skin:     General: Skin is warm and dry  Neurological:      Mental Status: He is alert and oriented to person, place, and time  Cranial Nerves: Facial asymmetry present  No cranial nerve deficit  Sensory: Sensory deficit (Patient states right side of face has decreased sensation) present  Deep Tendon Reflexes: Reflexes are normal and symmetric  Comments: Right-sided facial droop consistent with Bell's palsy no other focal neurological deficit on exam   Psychiatric:         Speech: Speech normal          Behavior: Behavior normal          Thought Content:  Thought content normal          Judgment: Judgment normal          Vital Signs  ED Triage Vitals [07/20/22 1424]   Temperature Pulse Respirations Blood Pressure SpO2   98 °F (36 7 °C) 96 16 128/92 97 %      Temp Source Heart Rate Source Patient Position - Orthostatic VS BP Location FiO2 (%)   Oral Monitor Sitting Left arm --      Pain Score       --           Vitals:    07/20/22 1424   BP: 128/92   Pulse: 96   Patient Position - Orthostatic VS: Sitting         Visual Acuity  Visual Acuity    Flowsheet Row Most Recent Value   L Pupil Size (mm) 4 R Pupil Size (mm) 4          ED Medications  Medications   methylPREDNISolone sodium succinate (Solu-MEDROL) injection 125 mg (125 mg Intravenous Given 7/20/22 1525)   acyclovir (ZOVIRAX) capsule 400 mg (400 mg Oral Given 7/20/22 1520)       Diagnostic Studies  Results Reviewed     Procedure Component Value Units Date/Time    CBC and differential [[id number]] Collected: 07/20/22 1521    Lab Status: Final result Specimen: Blood from Arm, Right Updated: 07/20/22 1537     WBC 9 15 Thousand/uL      RBC 4 94 Million/uL      Hemoglobin 14 6 g/dL      Hematocrit 43 1 %      MCV 87 fL      MCH 29 6 pg      MCHC 33 9 g/dL      RDW 13 2 %      MPV 9 2 fL      Platelets 414 Thousands/uL      nRBC 0 /100 WBCs      Neutrophils Relative 60 %      Immat GRANS % 1 %      Lymphocytes Relative 27 %      Monocytes Relative 8 %      Eosinophils Relative 3 %      Basophils Relative 1 %      Neutrophils Absolute 5 56 Thousands/µL      Immature Grans Absolute 0 08 Thousand/uL      Lymphocytes Absolute 2 44 Thousands/µL      Monocytes Absolute 0 71 Thousand/µL      Eosinophils Absolute 0 30 Thousand/µL      Basophils Absolute 0 06 Thousands/µL     Basic metabolic panel [[id number]] Collected: 07/20/22 1521    Lab Status: In process Specimen: Blood from Arm, Right Updated: 07/20/22 1529    Lyme Antibody Profile with reflex to WB [[id number]] Collected: 07/20/22 1521    Lab Status: In process Specimen: Blood from Arm, Right Updated: 07/20/22 1529                 CT head without contrast   Final Result by Rosalia Whittaker MD (07/20 1527)      No acute intracranial abnormality                    Workstation performed: [id number]                    Procedures  Procedures         ED Course                                             MDM  Number of Diagnoses or Management Options  Bell's palsy: new and requires workup     Amount and/or Complexity of Data Reviewed  Clinical lab tests: ordered and reviewed  Tests in the radiology section of CPT®: ordered  Tests in the medicine section of CPT®: ordered and reviewed  Independent visualization of images, tracings, or specimens: yes    Patient Progress  Patient progress: stable      Disposition  Final diagnoses:   Bell's palsy     Time reflects when diagnosis was documented in both MDM as applicable and the Disposition within this note     Time User Action Codes Description Comment    7/20/2022  3:38 PM Saad Mathew Add [G51 0] Bell's palsy       ED Disposition     ED Disposition   Discharge    Condition   Stable    Date/Time   Wed Jul 20, 2022  3:40 PM    [address] [facility] discharge to home/self care  Follow-up Information     Follow up With Specialties Details Why Contact Info    Diane Rose MD Family Medicine Schedule an appointment as soon as possible for a visit   [address]  [facility] [id number]  [phone]            Patient's Medications   Discharge Prescriptions    ACYCLOVIR (ZOVIRAX) 400 MG TABLET    Take 1 tablet (400 mg total) by mouth 5 (five) times a day for 7 days       Start Date: 7/20/2022 End Date: 7/27/2022       Order Dose: 400 mg       Quantity: 35 tablet    Refills: 0    PREDNISONE 10 MG TABLET    Take 4 tablets (40 mg total) by mouth daily for 4 days       Start Date: 7/21/2022 End Date: 7/25/2022       Order Dose: 40 mg       Quantity: 16 tablet    Refills: 0       No discharge procedures on file      PDMP Review     None          ED Provider  Electronically Signed by           Smith Mccauley DO  07/20/22 7913

## 2022-07-21 LAB — B BURGDOR IGG+IGM SER-ACNC: 0.4 AI

## 2022-08-06 ENCOUNTER — OFFICE VISIT (OUTPATIENT)
Dept: URGENT CARE | Facility: CLINIC | Age: 37
End: 2022-08-06
Payer: COMMERCIAL

## 2022-08-06 VITALS
OXYGEN SATURATION: 97 % | SYSTOLIC BLOOD PRESSURE: 130 MMHG | TEMPERATURE: 98.1 F | HEART RATE: 86 BPM | RESPIRATION RATE: 18 BRPM | DIASTOLIC BLOOD PRESSURE: 78 MMHG

## 2022-08-06 DIAGNOSIS — H66.002 NON-RECURRENT ACUTE SUPPURATIVE OTITIS MEDIA OF LEFT EAR WITHOUT SPONTANEOUS RUPTURE OF TYMPANIC MEMBRANE: Primary | ICD-10-CM

## 2022-08-06 PROCEDURE — 99213 OFFICE O/P EST LOW 20 MIN: CPT | Performed by: FAMILY MEDICINE

## 2022-08-06 RX ORDER — AMOXICILLIN AND CLAVULANATE POTASSIUM 875; 125 MG/1; MG/1
1 TABLET, FILM COATED ORAL EVERY 12 HOURS SCHEDULED
Qty: 20 TABLET | Refills: 0 | Status: SHIPPED | OUTPATIENT
Start: 2022-08-06 | End: 2022-08-14

## 2022-08-06 NOTE — PROGRESS NOTES
[facility]    NAME: Dory Washington is a 30 y o  male  : 1985    MRN: [id number]  DATE: 2022  TIME: 11:24 AM    Assessment and Plan   Non-recurrent acute suppurative otitis media of left ear without spontaneous rupture of tympanic membrane [H66 002]  1  Non-recurrent acute suppurative otitis media of left ear without spontaneous rupture of tympanic membrane  amoxicillin-clavulanate (AUGMENTIN) 875-125 mg per tablet     Will place on Augmentin 875/125 BID for 7 days  Advised to contact clinic if pain or infection get worse     Patient Instructions   There are no Patient Instructions on file for this visit  Follow up with PCP in 3-5 days  Proceed to ER if symptoms worsen  Chief Complaint     Chief Complaint   Patient presents with    Earache     Pt reports left sided ear pain since yesterday  History of Present Illness   Earache   There is pain in the left ear  This is a new problem  The current episode started yesterday  The problem has been unchanged  There has been no fever  The pain is mild  Pertinent negatives include no abdominal pain, coughing, ear discharge, headaches, hearing loss, neck pain, rash, rhinorrhea, sore throat or vomiting  He has tried acetaminophen for the symptoms  The treatment provided no relief  There is no history of a chronic ear infection or hearing loss  Review of Systems   Review of Systems   HENT: Positive for ear pain  Negative for ear discharge, hearing loss, rhinorrhea and sore throat  Respiratory: Negative for cough  Gastrointestinal: Negative for abdominal pain and vomiting  Musculoskeletal: Negative for neck pain  Skin: Negative for rash  Neurological: Negative for headaches  All other systems reviewed and are negative      The following portions of the patient's history were reviewed and updated as appropriate: allergies, current medications, past family history, past medical history, past social history, past surgical history and problem list      Medications have been verified  Objective   /78   Pulse 86   Temp 98 1 °F (36 7 °C)   Resp 18   SpO2 97%     Physical Exam  Vitals reviewed  Constitutional:       General: He is not in acute distress  Appearance: He is well-developed  He is not diaphoretic  HENT:      Head: Normocephalic and atraumatic  Right Ear: Hearing, tympanic membrane, ear canal and external ear normal  No tenderness  No middle ear effusion  There is no impacted cerumen  No foreign body  No mastoid tenderness  Tympanic membrane is not bulging  Left Ear: Hearing and external ear normal  Tenderness present  A middle ear effusion is present  There is no impacted cerumen  No foreign body  No mastoid tenderness  Tympanic membrane is bulging  Nose: Nose normal       Mouth/Throat:      Mouth: Mucous membranes are moist       Pharynx: Oropharynx is clear  No oropharyngeal exudate or posterior oropharyngeal erythema  Eyes:      General:         Right eye: No discharge  Left eye: No discharge  Extraocular Movements: Extraocular movements intact  Conjunctiva/sclera: Conjunctivae normal       Pupils: Pupils are equal, round, and reactive to light  Cardiovascular:      Rate and Rhythm: Normal rate and regular rhythm  Heart sounds: Normal heart sounds  No murmur heard  No friction rub  No gallop  Pulmonary:      Effort: Pulmonary effort is normal  No respiratory distress  Breath sounds: Normal breath sounds  No wheezing or rales  Abdominal:      General: Bowel sounds are normal  There is no distension  Palpations: Abdomen is soft  Tenderness: There is no abdominal tenderness  Musculoskeletal:         General: Normal range of motion  Cervical back: Normal range of motion and neck supple  Lymphadenopathy:      Cervical: No cervical adenopathy  Skin:     General: Skin is warm and dry  Findings: No erythema or rash     Neurological: Mental Status: He is alert and oriented to person, place, and time         Rpuerto Rubi MD

## 2022-08-09 ENCOUNTER — OFFICE VISIT (OUTPATIENT)
Dept: GASTROENTEROLOGY | Facility: CLINIC | Age: 37
End: 2022-08-09

## 2022-08-09 VITALS
BODY MASS INDEX: 46.15 KG/M2 | SYSTOLIC BLOOD PRESSURE: 118 MMHG | HEART RATE: 94 BPM | DIASTOLIC BLOOD PRESSURE: 89 MMHG | HEIGHT: 67 IN | WEIGHT: 294 LBS

## 2022-08-09 DIAGNOSIS — K64.9 HEMORRHOIDS, UNSPECIFIED HEMORRHOID TYPE: ICD-10-CM

## 2022-08-09 DIAGNOSIS — K62.5 BRBPR (BRIGHT RED BLOOD PER RECTUM): Primary | ICD-10-CM

## 2022-08-09 PROCEDURE — 46221 LIGATION OF HEMORRHOID(S): CPT | Performed by: INTERNAL MEDICINE

## 2022-08-09 NOTE — PROGRESS NOTES
Anal Excision Procedures  Performed by: Halle Villalta MD  Authorized by:  Halle Villalta MD     Universal Protocol:     Verbal consent obtained?: Yes      Written consent obtained?: Yes      Risks and benefits: Risks, benefits and alternatives were discussed      Consent given by:  Patient    Patient states understanding of procedure being performed: Yes      Patient identity confirmed:  Verbally with patient  A time out verifies correct patient, procedure, equipment, support staff and site/side marked as required:   Procedure Type: hemorrhoidectomy    Hemorrhoidectomy Details:   Hemorrhoid type: internal    Internal position:  Seven o'clock  Single rubber band ligation

## 2022-08-12 ENCOUNTER — APPOINTMENT (EMERGENCY)
Dept: CT IMAGING | Facility: HOSPITAL | Age: 37
DRG: 113 | End: 2022-08-12
Payer: COMMERCIAL

## 2022-08-12 ENCOUNTER — HOSPITAL ENCOUNTER (INPATIENT)
Facility: HOSPITAL | Age: 37
LOS: 2 days | Discharge: HOME | DRG: 113 | End: 2022-08-14
Attending: EMERGENCY MEDICINE | Admitting: INTERNAL MEDICINE
Payer: COMMERCIAL

## 2022-08-12 DIAGNOSIS — H70.91 MASTOIDITIS OF RIGHT SIDE: Primary | ICD-10-CM

## 2022-08-12 DIAGNOSIS — H66.91 RIGHT OTITIS MEDIA: ICD-10-CM

## 2022-08-12 DIAGNOSIS — H70.001 ACUTE MASTOIDITIS OF RIGHT SIDE: ICD-10-CM

## 2022-08-12 DIAGNOSIS — H92.09 EAR PAIN: ICD-10-CM

## 2022-08-12 PROBLEM — H70.002 ACUTE MASTOIDITIS OF LEFT SIDE: Status: ACTIVE | Noted: 2022-08-12

## 2022-08-12 PROBLEM — H70.90 MASTOIDITIS: Status: ACTIVE | Noted: 2022-08-12

## 2022-08-12 LAB
ALBUMIN SERPL BCP-MCNC: 3.5 G/DL (ref 3.5–5)
ALP SERPL-CCNC: 61 U/L (ref 34–104)
ALT SERPL W P-5'-P-CCNC: 19 U/L (ref 7–52)
ANION GAP SERPL CALCULATED.3IONS-SCNC: 10 MMOL/L (ref 4–13)
ANION GAP SERPL CALCULATED.3IONS-SCNC: 8 MMOL/L (ref 4–13)
AST SERPL W P-5'-P-CCNC: 20 U/L (ref 13–39)
BASOPHILS # BLD AUTO: 0.05 THOUSANDS/ΜL (ref 0–0.1)
BASOPHILS # BLD AUTO: 0.05 THOUSANDS/ΜL (ref 0–0.1)
BASOPHILS NFR BLD AUTO: 1 % (ref 0–1)
BASOPHILS NFR BLD AUTO: 1 % (ref 0–1)
BILIRUB SERPL-MCNC: 0.28 MG/DL (ref 0.2–1)
BUN SERPL-MCNC: 16 MG/DL (ref 5–25)
BUN SERPL-MCNC: 17 MG/DL (ref 5–25)
CALCIUM SERPL-MCNC: 7.9 MG/DL (ref 8.4–10.2)
CALCIUM SERPL-MCNC: 9.3 MG/DL (ref 8.4–10.2)
CHLORIDE SERPL-SCNC: 103 MMOL/L (ref 96–108)
CHLORIDE SERPL-SCNC: 106 MMOL/L (ref 96–108)
CO2 SERPL-SCNC: 23 MMOL/L (ref 21–32)
CO2 SERPL-SCNC: 26 MMOL/L (ref 21–32)
CREAT SERPL-MCNC: 0.81 MG/DL (ref 0.6–1.3)
CREAT SERPL-MCNC: 0.95 MG/DL (ref 0.6–1.3)
EOSINOPHIL # BLD AUTO: 0.26 THOUSAND/ΜL (ref 0–0.61)
EOSINOPHIL # BLD AUTO: 0.29 THOUSAND/ΜL (ref 0–0.61)
EOSINOPHIL NFR BLD AUTO: 3 % (ref 0–6)
EOSINOPHIL NFR BLD AUTO: 3 % (ref 0–6)
ERYTHROCYTE [DISTWIDTH] IN BLOOD BY AUTOMATED COUNT: 13.3 % (ref 11.6–15.1)
ERYTHROCYTE [DISTWIDTH] IN BLOOD BY AUTOMATED COUNT: 13.5 % (ref 11.6–15.1)
EST. AVERAGE GLUCOSE BLD GHB EST-MCNC: 131 MG/DL
GFR SERPL CREATININE-BSD FRML MDRD: 103 ML/MIN/1.73SQ M
GFR SERPL CREATININE-BSD FRML MDRD: 115 ML/MIN/1.73SQ M
GLUCOSE SERPL-MCNC: 104 MG/DL (ref 65–140)
GLUCOSE SERPL-MCNC: 96 MG/DL (ref 65–140)
HBA1C MFR BLD: 6.2 %
HCT VFR BLD AUTO: 36.3 % (ref 36.5–49.3)
HCT VFR BLD AUTO: 37.7 % (ref 36.5–49.3)
HGB BLD-MCNC: 12.4 G/DL (ref 12–17)
HGB BLD-MCNC: 12.7 G/DL (ref 12–17)
IMM GRANULOCYTES # BLD AUTO: 0.05 THOUSAND/UL (ref 0–0.2)
IMM GRANULOCYTES # BLD AUTO: 0.06 THOUSAND/UL (ref 0–0.2)
IMM GRANULOCYTES NFR BLD AUTO: 1 % (ref 0–2)
IMM GRANULOCYTES NFR BLD AUTO: 1 % (ref 0–2)
LYMPHOCYTES # BLD AUTO: 2.37 THOUSANDS/ΜL (ref 0.6–4.47)
LYMPHOCYTES # BLD AUTO: 2.63 THOUSANDS/ΜL (ref 0.6–4.47)
LYMPHOCYTES NFR BLD AUTO: 24 % (ref 14–44)
LYMPHOCYTES NFR BLD AUTO: 27 % (ref 14–44)
MCH RBC QN AUTO: 29.6 PG (ref 26.8–34.3)
MCH RBC QN AUTO: 29.9 PG (ref 26.8–34.3)
MCHC RBC AUTO-ENTMCNC: 33.7 G/DL (ref 31.4–37.4)
MCHC RBC AUTO-ENTMCNC: 34.2 G/DL (ref 31.4–37.4)
MCV RBC AUTO: 88 FL (ref 82–98)
MCV RBC AUTO: 88 FL (ref 82–98)
MONOCYTES # BLD AUTO: 0.86 THOUSAND/ΜL (ref 0.17–1.22)
MONOCYTES # BLD AUTO: 0.9 THOUSAND/ΜL (ref 0.17–1.22)
MONOCYTES NFR BLD AUTO: 9 % (ref 4–12)
MONOCYTES NFR BLD AUTO: 9 % (ref 4–12)
NEUTROPHILS # BLD AUTO: 6.01 THOUSANDS/ΜL (ref 1.85–7.62)
NEUTROPHILS # BLD AUTO: 6.06 THOUSANDS/ΜL (ref 1.85–7.62)
NEUTS SEG NFR BLD AUTO: 59 % (ref 43–75)
NEUTS SEG NFR BLD AUTO: 62 % (ref 43–75)
NRBC BLD AUTO-RTO: 0 /100 WBCS
NRBC BLD AUTO-RTO: 0 /100 WBCS
PLATELET # BLD AUTO: 287 THOUSANDS/UL (ref 149–390)
PLATELET # BLD AUTO: 301 THOUSANDS/UL (ref 149–390)
PMV BLD AUTO: 9.3 FL (ref 8.9–12.7)
PMV BLD AUTO: 9.4 FL (ref 8.9–12.7)
POTASSIUM SERPL-SCNC: 4 MMOL/L (ref 3.5–5.3)
POTASSIUM SERPL-SCNC: 4.1 MMOL/L (ref 3.5–5.3)
PROT SERPL-MCNC: 6.1 G/DL (ref 6.4–8.4)
RBC # BLD AUTO: 4.15 MILLION/UL (ref 3.88–5.62)
RBC # BLD AUTO: 4.29 MILLION/UL (ref 3.88–5.62)
SODIUM SERPL-SCNC: 137 MMOL/L (ref 135–147)
SODIUM SERPL-SCNC: 139 MMOL/L (ref 135–147)
WBC # BLD AUTO: 9.72 THOUSAND/UL (ref 4.31–10.16)
WBC # BLD AUTO: 9.87 THOUSAND/UL (ref 4.31–10.16)

## 2022-08-12 PROCEDURE — 85025 COMPLETE CBC W/AUTO DIFF WBC: CPT | Performed by: EMERGENCY MEDICINE

## 2022-08-12 PROCEDURE — G1004 CDSM NDSC: HCPCS

## 2022-08-12 PROCEDURE — 99285 EMERGENCY DEPT VISIT HI MDM: CPT | Performed by: EMERGENCY MEDICINE

## 2022-08-12 PROCEDURE — 85025 COMPLETE CBC W/AUTO DIFF WBC: CPT | Performed by: PHYSICIAN ASSISTANT

## 2022-08-12 PROCEDURE — 80053 COMPREHEN METABOLIC PANEL: CPT | Performed by: EMERGENCY MEDICINE

## 2022-08-12 PROCEDURE — 83036 HEMOGLOBIN GLYCOSYLATED A1C: CPT | Performed by: PHYSICIAN ASSISTANT

## 2022-08-12 PROCEDURE — 96374 THER/PROPH/DIAG INJ IV PUSH: CPT

## 2022-08-12 PROCEDURE — 80048 BASIC METABOLIC PNL TOTAL CA: CPT | Performed by: PHYSICIAN ASSISTANT

## 2022-08-12 PROCEDURE — 36415 COLL VENOUS BLD VENIPUNCTURE: CPT | Performed by: EMERGENCY MEDICINE

## 2022-08-12 PROCEDURE — 99222 1ST HOSP IP/OBS MODERATE 55: CPT | Performed by: INTERNAL MEDICINE

## 2022-08-12 PROCEDURE — 70481 CT ORBIT/EAR/FOSSA W/DYE: CPT

## 2022-08-12 PROCEDURE — 99284 EMERGENCY DEPT VISIT MOD MDM: CPT

## 2022-08-12 PROCEDURE — 87081 CULTURE SCREEN ONLY: CPT | Performed by: INTERNAL MEDICINE

## 2022-08-12 PROCEDURE — 96375 TX/PRO/DX INJ NEW DRUG ADDON: CPT

## 2022-08-12 RX ORDER — OXYCODONE HYDROCHLORIDE 10 MG/1
10 TABLET ORAL EVERY 4 HOURS PRN
Status: DISCONTINUED | OUTPATIENT
Start: 2022-08-12 | End: 2022-08-14 | Stop reason: HOSPADM

## 2022-08-12 RX ORDER — CEFEPIME HYDROCHLORIDE 2 G/50ML
2000 INJECTION, SOLUTION INTRAVENOUS EVERY 8 HOURS
Status: DISCONTINUED | OUTPATIENT
Start: 2022-08-12 | End: 2022-08-14 | Stop reason: HOSPADM

## 2022-08-12 RX ORDER — ENOXAPARIN SODIUM 100 MG/ML
40 INJECTION SUBCUTANEOUS 2 TIMES DAILY
Status: DISCONTINUED | OUTPATIENT
Start: 2022-08-12 | End: 2022-08-14 | Stop reason: HOSPADM

## 2022-08-12 RX ORDER — MORPHINE SULFATE 10 MG/ML
6 INJECTION, SOLUTION INTRAMUSCULAR; INTRAVENOUS ONCE
Status: COMPLETED | OUTPATIENT
Start: 2022-08-12 | End: 2022-08-12

## 2022-08-12 RX ORDER — OXYMETAZOLINE HYDROCHLORIDE 0.05 G/100ML
2 SPRAY NASAL ONCE
Status: COMPLETED | OUTPATIENT
Start: 2022-08-12 | End: 2022-08-12

## 2022-08-12 RX ORDER — ACETAMINOPHEN 325 MG/1
975 TABLET ORAL EVERY 8 HOURS
Status: DISCONTINUED | OUTPATIENT
Start: 2022-08-12 | End: 2022-08-14 | Stop reason: HOSPADM

## 2022-08-12 RX ORDER — KETOROLAC TROMETHAMINE 30 MG/ML
30 INJECTION, SOLUTION INTRAMUSCULAR; INTRAVENOUS ONCE
Status: COMPLETED | OUTPATIENT
Start: 2022-08-12 | End: 2022-08-12

## 2022-08-12 RX ORDER — HYDROMORPHONE HCL/PF 1 MG/ML
1 SYRINGE (ML) INJECTION EVERY 4 HOURS PRN
Status: DISCONTINUED | OUTPATIENT
Start: 2022-08-12 | End: 2022-08-14 | Stop reason: HOSPADM

## 2022-08-12 RX ORDER — HYDROMORPHONE HCL/PF 1 MG/ML
0.5 SYRINGE (ML) INJECTION EVERY 4 HOURS PRN
Status: DISCONTINUED | OUTPATIENT
Start: 2022-08-12 | End: 2022-08-12

## 2022-08-12 RX ORDER — OXYCODONE HYDROCHLORIDE 5 MG/1
5 TABLET ORAL EVERY 4 HOURS PRN
Status: DISCONTINUED | OUTPATIENT
Start: 2022-08-12 | End: 2022-08-14 | Stop reason: HOSPADM

## 2022-08-12 RX ADMIN — IOHEXOL 70 ML: 350 INJECTION, SOLUTION INTRAVENOUS at 03:44

## 2022-08-12 RX ADMIN — ACETAMINOPHEN 975 MG: 325 TABLET, FILM COATED ORAL at 12:55

## 2022-08-12 RX ADMIN — CEFEPIME HYDROCHLORIDE 2000 MG: 2 INJECTION, SOLUTION INTRAVENOUS at 12:56

## 2022-08-12 RX ADMIN — MORPHINE SULFATE 6 MG: 10 INJECTION INTRAVENOUS at 04:15

## 2022-08-12 RX ADMIN — ENOXAPARIN SODIUM 40 MG: 40 INJECTION SUBCUTANEOUS at 17:25

## 2022-08-12 RX ADMIN — ACETAMINOPHEN 975 MG: 325 TABLET, FILM COATED ORAL at 22:10

## 2022-08-12 RX ADMIN — CEFEPIME HYDROCHLORIDE 2000 MG: 2 INJECTION, SOLUTION INTRAVENOUS at 07:46

## 2022-08-12 RX ADMIN — CEFEPIME HYDROCHLORIDE 2000 MG: 2 INJECTION, SOLUTION INTRAVENOUS at 22:10

## 2022-08-12 RX ADMIN — VANCOMYCIN HYDROCHLORIDE 1500 MG: 1 INJECTION, POWDER, LYOPHILIZED, FOR SOLUTION INTRAVENOUS at 10:05

## 2022-08-12 RX ADMIN — OXYMETAZOLINE HYDROCHLORIDE 2 SPRAY: 0.05 SPRAY NASAL at 04:26

## 2022-08-12 RX ADMIN — ACETAMINOPHEN 975 MG: 325 TABLET, FILM COATED ORAL at 07:45

## 2022-08-12 RX ADMIN — KETOROLAC TROMETHAMINE 30 MG: 30 INJECTION, SOLUTION INTRAMUSCULAR at 02:53

## 2022-08-12 RX ADMIN — VANCOMYCIN HYDROCHLORIDE 1500 MG: 1 INJECTION, POWDER, LYOPHILIZED, FOR SOLUTION INTRAVENOUS at 16:03

## 2022-08-12 RX ADMIN — ENOXAPARIN SODIUM 40 MG: 40 INJECTION SUBCUTANEOUS at 10:06

## 2022-08-12 NOTE — PLAN OF CARE
Problem: PAIN - ADULT  Goal: Verbalizes/displays adequate comfort level or baseline comfort level  Description: Interventions:  - Encourage patient to monitor pain and request assistance  - Assess pain using appropriate pain scale  - Administer analgesics based on type and severity of pain and evaluate response  - Implement non-pharmacological measures as appropriate and evaluate response  - Consider cultural and social influences on pain and pain management  - Notify physician/advanced practitioner if interventions unsuccessful or patient reports new pain  Outcome: Progressing     Problem: INFECTION - ADULT  Goal: Absence or prevention of progression during hospitalization  Description: INTERVENTIONS:  - Assess and monitor for signs and symptoms of infection  - Monitor lab/diagnostic results  - Monitor all insertion sites, i e  indwelling lines, tubes, and drains  - Monitor endotracheal if appropriate and nasal secretions for changes in amount and color  - [place] appropriate cooling/warming therapies per order  - Administer medications as ordered  - Instruct and encourage patient and family to use good hand hygiene technique  - Identify and instruct in appropriate isolation precautions for identified infection/condition  Outcome: Progressing     Problem: SAFETY ADULT  Goal: Patient will remain free of falls  Description: INTERVENTIONS:  - Educate patient/family on patient safety including physical limitations  - Instruct patient to call for assistance with activity   - Consult OT/PT to assist with strengthening/mobility   - Keep Call bell within reach  - Keep bed low and locked with side rails adjusted as appropriate  - Keep care items and personal belongings within reach  - Initiate and maintain comfort rounds  - Make Fall Risk Sign visible to staff  - Offer Toileting every 3 Hours, in advance of need  - Initiate/Maintain bedalarm  - Obtain necessary fall risk management equipment:   - Apply yellow socks and bracelet for high fall risk patients  - Consider moving patient to room near nurses station  Outcome: Progressing     Problem: DISCHARGE PLANNING  Goal: Discharge to home or other facility with appropriate resources  Description: INTERVENTIONS:  - Identify barriers to discharge w/patient and caregiver  - Arrange for needed discharge resources and transportation as appropriate  - Identify discharge learning needs (meds, wound care, etc )  - Arrange for interpretive services to assist at discharge as needed  - Refer to Case Management Department for coordinating discharge planning if the patient needs post-hospital services based on physician/advanced practitioner order or complex needs related to functional status, cognitive ability, or social support system  Outcome: Progressing

## 2022-08-12 NOTE — PROGRESS NOTES
Vancomycin Assessment    Halina Dias is a 30 y o  male who is currently receiving vancomycin 2000mg Q12H for other Bone/joint infection   Relevant clinical data and objective history reviewed:  Creatinine   Date Value Ref Range Status   08/12/2022 0 81 0 60 - 1 30 mg/dL Final     Comment:     Standardized to IDMS reference method   07/20/2022 0 93 0 60 - 1 30 mg/dL Final     Comment:     Standardized to IDMS reference method   01/21/2021 0 93 0 50 - 1 20 mg/dL Final     Comment:     Standardized to IDMS reference method     /66 (BP Location: Right arm)   Pulse 71   Temp 98 6 °F (37 °C) (Oral)   Resp 16   Ht 5' 7" (1 702 m)   Wt 133 kg (294 lb)   SpO2 100%   BMI 46 05 kg/m²   No intake/output data recorded  Lab Results   Component Value Date/Time    BUN 16 08/12/2022 04:24 AM    WBC 9 72 08/12/2022 04:24 AM    HGB 12 4 08/12/2022 04:24 AM    HCT 36 3 (L) 08/12/2022 04:24 AM    MCV 88 08/12/2022 04:24 AM     08/12/2022 04:24 AM     Temp Readings from Last 3 Encounters:   08/12/22 98 6 °F (37 °C) (Oral)   08/06/22 98 1 °F (36 7 °C)   07/20/22 98 °F (36 7 °C) (Oral)     Vancomycin Days of Therapy: 1    Assessment/Plan  The patient is currently on vancomycin utilizing scheduled dosing based on adjusted body weight (due to obesity)  Baseline risks associated with therapy include: dehydration  The patient is currently receiving 2000mg Q12H and after clinical evaluation will be changed to 1500mg Q8H  Pharmacy will also follow closely for s/sx of nephrotoxicity, infusion reactions, and appropriateness of therapy  BMP and CBC will be ordered per protocol  Plan for trough as patient approaches steady state, prior to the 5th  dose at approximately 1330 on 8/13/22  Due to infection severity, will target a trough of 15-20 (appropriate for most indications)   Pharmacy will continue to follow the patients culture results and clinical progress daily      Noam Hopkins, Pharmacist

## 2022-08-12 NOTE — DISCHARGE INSTRUCTIONS
You were seen today in the Emergency Department  Please follow up with your Primary Care Provider in the next 1-2 days to recheck your symptoms and to follow up on your visit to the Emergency Department today  Please return to the Emergency Department if you have worsening symptoms, fevers, chills, chest pain, shortness of breath, are unable to eat or drink, or have any other symptoms that concern you  Please look this over and let your nurse and/or me know if you have any further questions before you leave

## 2022-08-12 NOTE — H&P
Jo U  68   H&P- Kimberly Smith 1985, 30 y o  male MRN: [id number]  Unit/Bed#: -01 Encounter: [id number]  Primary Care Provider: Nehemias Coburn MD   Date and time admitted to hospital: 8/12/2022  2:27 AM    * Acute mastoiditis of right side  Assessment & Plan  Patient presents with worsening right ear pain after receiving Augmentin from PCP for right acute otitis media on 08/06/22  Of note, patient also seen in the ED 07/20/22 for right sided facial droop and decreased sensation on right side of the face and was diagnosed with [place] Palsy and discharged with acyclovir x 7 days and prednisone x 4 days  · CT shows right otomastoiditis  · Currently afebrile and without leukocytosis  · Patient currently without evidence of facial asymmetry, no rash on the right side of the face or ear    · Discussed with ENT on call, no need to transfer for ENT evaluation at this time  · IV Cefepime 2g TID + Vancomycin   · Pain management   · If no improvement from IV abx, patient may require transfer for ENT intervention     Morbid obesity ([address] )  Assessment & Plan  · Encourage weightloss/lifestyle modification   · Check HgbA1c      VTE Pharmacologic Prophylaxis: VTE Score: 3 Moderate Risk (Score 3-4) - Pharmacological DVT Prophylaxis Ordered: enoxaparin (Lovenox)  Code Status: Level 1 - Full Code   Discussion with family: Patient declined call to   Anticipated Length of Stay: Patient will be admitted on an inpatient basis with an anticipated length of stay of greater than 2 midnights secondary to IV abx for acute mastoiditis   Total Time for Visit, including Counseling / Coordination of Care: 45 minutes Greater than 50% of this total time spent on direct patient counseling and coordination of care      Chief Complaint: right ear pain     History of Present Illness:  Kimberly Smith is a 30 y o  male with a PMH of recently diagnosed [place] Palsy who presents with worsening right ear pain after receiving Augmentin from PCP for right acute otitis media on 08/06/22  Patient reports that he took his antibiotics as prescribed  He states the pain is currently on the back of his ear  He denies any fever/chills/tinnitus/abdominal pain/nausea/vomiting/diarrhea  Of note, patient was recently seen in the ED 07/20/2022 for right sided facial droop and decreased sensation on the right side of the face and was diagnosed with [place] Palsy and discharged with acyclovir x 7 days and prednisone x 4 days which he states he completed  Patient denies any issues with facial droop or decreased sensation of the right side of his face since then  He denies any recent sick contact, travel, no recent URI  Review of Systems:  Review of Systems   Constitutional: Positive for activity change  Negative for chills and fever  HENT: Positive for ear pain  Negative for congestion, ear discharge, hearing loss, sore throat, tinnitus and trouble swallowing  Eyes: Negative for visual disturbance  Respiratory: Negative for cough, choking, shortness of breath and wheezing  Cardiovascular: Negative for chest pain and palpitations  Gastrointestinal: Negative for abdominal pain, diarrhea, nausea and vomiting  Genitourinary: Negative for dysuria  Musculoskeletal: Negative for arthralgias and myalgias  Skin: Negative for color change, pallor and rash  Neurological: Negative for dizziness, tremors, seizures, syncope, facial asymmetry, speech difficulty, weakness, light-headedness, numbness and headaches  Psychiatric/Behavioral: Negative for confusion  Past Medical and Surgical History:   Past Medical History:   Diagnosis Date    Constipation     Hemorrhoids     Seasonal allergies        Past Surgical History:   Procedure Laterality Date    NO PAST SURGERIES         Meds/Allergies:  Prior to Admission medications    Medication Sig Start Date End Date Taking?  Authorizing Provider   acyclovir (ZOVIRAX) 400 MG tablet Take 1 tablet (400 mg total) by mouth 5 (five) times a day for 7 days 7/20/22 7/27/22  Miguel lA DO   amoxicillin-clavulanate (AUGMENTIN) 875-125 mg per tablet Take 1 tablet by mouth every 12 (twelve) hours for 10 days 8/6/22 8/16/22  Rebecca Munoz MD   benzocaine (AMERICAINE) 20 % rectal ointment Insert into the rectum every 3 (three) hours as needed for itching or hemorrhoids  Patient not taking: No sig reported 6/29/22   Simon Corbett MD   hydrocortisone (ANUSOL-HC) 2 5 % rectal cream Apply topically 2 (two) times a day for 7 days  Patient not taking: No sig reported 6/29/22 7/6/22  Simon Corbett MD   hydrocortisone (ANUSOL-HC) 25 mg suppository Insert 1 suppository (25 mg total) into the rectum 2 (two) times a day for 14 days  Patient not taking: Reported on 8/9/2022 7/6/22 7/20/22  JAVI Phillips   loratadine (CLARITIN) 10 mg tablet Take 10 mg by mouth daily    Historical Provider, MD   Multiple Vitamins-Minerals (Multivitamin Adult) [facility] 16    Historical Provider, MD   polyethylene glycol (GLYCOLAX) 17 GM/SCOOP powder Take 17 g by mouth daily 6/29/22   Simon Corbett MD   senna-docusate sodium (SENOKOT-S) 8 6-50 mg per tablet Take 1 tablet by mouth 2 (two) times a day as needed for constipation 6/29/22   Simon Corbett MD     I have reviewed home medications with patient personally      Allergies: No Known Allergies    Social History:  Marital Status: /Civil Union   Patient Pre-hospital Living Situation: Home  Patient Pre-hospital Level of Mobility: walks  Patient Pre-hospital Diet Restrictions: none   Substance Use History:   Social History     Substance and Sexual Activity   Alcohol Use Not Currently    Comment: rare     Social History     Tobacco Use   Smoking Status Never Smoker   Smokeless Tobacco Never Used     Social History     Substance and Sexual Activity   Drug Use Never       Family History:  Family History   Problem Relation Age of Onset    Hypertension Mother    Denton Moore Stroke Father     No Known Problems Sister     No Known Problems Brother     No Known Problems Maternal Aunt     No Known Problems Maternal Uncle     No Known Problems Paternal Aunt     No Known Problems Paternal Uncle     No Known Problems Maternal Grandmother     No Known Problems Maternal Grandfather     No Known Problems Paternal Grandmother     No Known Problems Paternal Grandfather        Physical Exam:     Vitals:   Blood Pressure: 106/66 (08/12/22 0458)  Pulse: 71 (08/12/22 0458)  Temperature: 98 6 °F (37 °C) (08/12/22 0458)  Temp Source: Oral (08/12/22 0458)  Respirations: 16 (08/12/22 0458)  Height: 5' 7" (170 2 cm) (08/12/22 0230)  Weight - Scale: 133 kg (294 lb) (08/12/22 0230)  SpO2: 100 % (08/12/22 0458)    Physical Exam  Constitutional:       General: He is not in acute distress  Appearance: He is obese  He is not ill-appearing, toxic-appearing or diaphoretic  HENT:      Ears:      Comments: TTP of right mastoid with some mild overlying erythema; no evidence of vesicular rash     Mouth/Throat:      Mouth: Mucous membranes are moist    Eyes:      General: No scleral icterus  Cardiovascular:      Rate and Rhythm: Normal rate and regular rhythm  Heart sounds: Normal heart sounds  Pulmonary:      Breath sounds: Normal breath sounds  Abdominal:      General: Abdomen is flat  Bowel sounds are normal       Palpations: Abdomen is soft  Tenderness: There is no abdominal tenderness  Musculoskeletal:      Right lower leg: No edema  Left lower leg: No edema  Skin:     General: Skin is warm  Findings: No lesion or rash  Neurological:      General: No focal deficit present  Mental Status: He is alert and oriented to person, place, and time  Cranial Nerves: No cranial nerve deficit  Sensory: No sensory deficit  Motor: No weakness     Psychiatric:         Mood and Affect: Mood normal           Additional Data:     Lab Results:  Results from last 7 days Lab Units 08/12/22  0424   WBC Thousand/uL 9 72   HEMOGLOBIN g/dL 12 4   HEMATOCRIT % 36 3*   PLATELETS Thousands/uL 287   NEUTROS PCT % 62   LYMPHS PCT % 24   MONOS PCT % 9   EOS PCT % 3                           Imaging: Reviewed radiology reports from this admission including: CT orbit/temporal bone/skull base   CT orbits/temporal bones/skull base w contrast   Final Result by Wesly Maharaj MD (08/12 0400)      Partial opacification of the right mastoid air cells and fluid within the right middle ear cavity compatible with otomastoiditis  Workstation performed: [id number]             EKG and Other Studies Reviewed on Admission:   · EKG: No EKG obtained  ** Please Note: This note has been constructed using a voice recognition system   **

## 2022-08-12 NOTE — Clinical Note
Wilson Alpers was seen and treated in our emergency department on 8/12/2022  Diagnosis:     Dory Tyler    He may return on this date: 08/15/2022         If you have any questions or concerns, please don't hesitate to call        Bao Acuna DO    ______________________________           _______________          _______________  Hospital Representative                              Date                                Time

## 2022-08-12 NOTE — Clinical Note
accompanied Marti Gunter to the emergency department on 8/12/2022  Return date if applicable: 86/67/0970        If you have any questions or concerns, please don't hesitate to call        James Garza, DO

## 2022-08-12 NOTE — ASSESSMENT & PLAN NOTE
Patient presents with worsening right ear pain after receiving Augmentin from PCP for right acute otitis media on 08/06/22  Of note, patient also seen in the ED 07/20/22 for right sided facial droop and decreased sensation on right side of the face and was diagnosed with [place] Palsy and discharged with acyclovir x 7 days and prednisone x 4 days     · CT shows right otomastoiditis  · Currently afebrile and without leukocytosis  · Patient currently without evidence of facial asymmetry, no rash on the right side of the face or ear    · Discussed with ENT on call, no need to transfer for ENT evaluation at this time  · IV Cefepime 2g TID + Vancomycin   · Pain management   · If no improvement from IV abx, patient may require transfer for ENT intervention

## 2022-08-12 NOTE — ED PROVIDER NOTES
History  Chief Complaint   Patient presents with    Earache     Pt states he was seen at an urgent care for the same and is taking amoxicillin with no relief  Pt states it feels like he is underwater     Pt is a 29 yo M with PMHx significant for recent dx of Bell's palsy who presents today for R ear pain one week for which he has been taking amoxicillin and OTC pain medications without relief  Pain travels to behind and around the ear  It is severe in severity  No clear exacerbating or relieving factors  No hx of similar symptoms  Pain is keeping him from sleeping at night  No fevers/chills, n/v/d, vision changes, facial pain, paresthesias, CP, SOB, pain anywhere else  ROS otherwise negative  History provided by:  Medical records and patient   used: No        Prior to Admission Medications   Prescriptions Last Dose Informant Patient Reported? Taking?    Multiple Vitamins-Minerals (Multivitamin Adult) CHEW  Self Yes No   Sig: Chew   acyclovir (ZOVIRAX) 400 MG tablet   No No   Sig: Take 1 tablet (400 mg total) by mouth 5 (five) times a day for 7 days   amoxicillin-clavulanate (AUGMENTIN) 875-125 mg per tablet  Self No No   Sig: Take 1 tablet by mouth every 12 (twelve) hours for 10 days   benzocaine (AMERICAINE) 20 % rectal ointment  Self No No   Sig: Insert into the rectum every 3 (three) hours as needed for itching or hemorrhoids   Patient not taking: No sig reported   hydrocortisone (ANUSOL-HC) 2 5 % rectal cream  Self No No   Sig: Apply topically 2 (two) times a day for 7 days   Patient not taking: No sig reported   hydrocortisone (ANUSOL-HC) 25 mg suppository   No No   Sig: Insert 1 suppository (25 mg total) into the rectum 2 (two) times a day for 14 days   Patient not taking: Reported on 8/9/2022   loratadine (CLARITIN) 10 mg tablet  Self Yes No   Sig: Take 10 mg by mouth daily   polyethylene glycol (GLYCOLAX) 17 GM/SCOOP powder  Self No No   Sig: Take 17 g by mouth daily senna-docusate sodium (SENOKOT-S) 8 6-50 mg per tablet  Self No No   Sig: Take 1 tablet by mouth 2 (two) times a day as needed for constipation      Facility-Administered Medications: None       Past Medical History:   Diagnosis Date    Constipation     Hemorrhoids     Seasonal allergies        Past Surgical History:   Procedure Laterality Date    NO PAST SURGERIES         Family History   Problem Relation Age of Onset    Hypertension Mother     Stroke Father     No Known Problems Sister     No Known Problems Brother     No Known Problems Maternal Aunt     No Known Problems Maternal Uncle     No Known Problems Paternal Aunt     No Known Problems Paternal Uncle     No Known Problems Maternal Grandmother     No Known Problems Maternal Grandfather     No Known Problems Paternal Grandmother     No Known Problems Paternal Grandfather      I have reviewed and agree with the history as documented  E-Cigarette/Vaping    E-Cigarette Use Never User      E-Cigarette/Vaping Substances    Nicotine No     THC No     CBD No     Flavoring No     Other No     Unknown No      Social History     Tobacco Use    Smoking status: Never Smoker    Smokeless tobacco: Never Used   Vaping Use    Vaping Use: Never used   Substance Use Topics    Alcohol use: Not Currently     Comment: rare    Drug use: Never       Review of Systems   Reason unable to perform ROS: please see above for ROS  All other ROS negative  Physical Exam  Physical Exam  Vitals and nursing note reviewed  Constitutional:       General: He is not in acute distress  Appearance: He is well-developed  He is not diaphoretic  HENT:      Head: Normocephalic and atraumatic  Right Ear: Ear canal normal  No drainage  A middle ear effusion is present  There is mastoid tenderness (mild, no significant overlying erythema)  Tympanic membrane is erythematous and bulging  Tympanic membrane is not perforated     Eyes:      General: No scleral icterus  Conjunctiva/sclera: Conjunctivae normal    Neck:      Vascular: No JVD  Trachea: No tracheal deviation  Cardiovascular:      Rate and Rhythm: Normal rate  Pulmonary:      Effort: Pulmonary effort is normal    Abdominal:      General: There is no distension  Musculoskeletal:         General: No deformity  Cervical back: Neck supple  Skin:     General: Skin is warm and dry  Neurological:      Mental Status: He is alert     Psychiatric:         Behavior: Behavior normal          Vital Signs  ED Triage Vitals   Temperature Pulse Respirations Blood Pressure SpO2   08/12/22 0230 08/12/22 0230 08/12/22 0230 08/12/22 0230 08/12/22 0230   98 7 °F (37 1 °C) 83 16 145/88 98 %      Temp Source Heart Rate Source Patient Position - Orthostatic VS BP Location FiO2 (%)   08/12/22 0230 08/12/22 0430 08/12/22 0230 08/12/22 0230 --   Oral Monitor Sitting Right arm       Pain Score       08/12/22 0230       8           Vitals:    08/12/22 0230 08/12/22 0430   BP: 145/88 135/61   Pulse: 83 73   Patient Position - Orthostatic VS: Sitting          Visual Acuity      ED Medications  Medications   ketorolac (TORADOL) injection 30 mg (30 mg Intravenous Given 8/12/22 0253)   iohexol (OMNIPAQUE) 350 MG/ML injection (MULTI-DOSE) 100 mL (70 mL Intravenous Given 8/12/22 0344)   morphine injection 6 mg (6 mg Intravenous Given 8/12/22 0415)   oxymetazoline (AFRIN) 0 05 % nasal spray 2 spray (2 sprays Each Nare Given 8/12/22 0426)       Diagnostic Studies  Results Reviewed     Procedure Component Value Units Date/Time    CBC and differential [[id number]]  (Abnormal) Collected: 08/12/22 0424    Lab Status: Final result Specimen: Blood from Arm, Right Updated: 08/12/22 0437     WBC 9 72 Thousand/uL      RBC 4 15 Million/uL      Hemoglobin 12 4 g/dL      Hematocrit 36 3 %      MCV 88 fL      MCH 29 9 pg      MCHC 34 2 g/dL      RDW 13 3 %      MPV 9 3 fL      Platelets 649 Thousands/uL      nRBC 0 /100 WBCs Neutrophils Relative 62 %      Immat GRANS % 1 %      Lymphocytes Relative 24 %      Monocytes Relative 9 %      Eosinophils Relative 3 %      Basophils Relative 1 %      Neutrophils Absolute 6 06 Thousands/µL      Immature Grans Absolute 0 05 Thousand/uL      Lymphocytes Absolute 2 37 Thousands/µL      Monocytes Absolute 0 90 Thousand/µL      Eosinophils Absolute 0 29 Thousand/µL      Basophils Absolute 0 05 Thousands/µL     Comprehensive metabolic panel [[id number]] Collected: 08/12/22 0424    Lab Status: In process Specimen: Blood from Arm, Right Updated: 08/12/22 0435                 CT orbits/temporal bones/skull base w contrast   Final Result by Parth Lee MD (08/12 0400)      Partial opacification of the right mastoid air cells and fluid within the right middle ear cavity compatible with otomastoiditis  Workstation performed: [id number]                    Procedures  Procedures         ED Course  ED Course as of 08/12/22 0443   Fri Aug 12, 2022   0238 Pt otherwise healthy, normal renal function 2-3 weeks ago  Defer labs prior to CT                               SBIRT 20yo+    Flowsheet Row Most Recent Value   SBIRT (25 yo +)    In order to provide better care to our patients, we are screening all of our patients for alcohol and drug use  Would it be okay to ask you these screening questions? Yes Filed at: 08/12/2022 4669   Initial Alcohol Screen: US AUDIT-C     1  How often do you have a drink containing alcohol? 0 Filed at: 08/12/2022 0237   2  How many drinks containing alcohol do you have on a typical day you are drinking? 0 Filed at: 08/12/2022 0237   3a  Male UNDER 65: How often do you have five or more drinks on one occasion? 0 Filed at: 08/12/2022 0237   Audit-C Score 0 Filed at: 08/12/2022 1097   KAYLEE: How many times in the past year have you    Used an illegal drug or used a prescription medication for non-medical reasons?  Never Filed at: 08/12/2022 0237                    [facility]  Number of Diagnoses or Management Options  Diagnosis management comments: Initial ED diagnostics to include CT temporal bones  Initial ddx includes but is not limited to: otitis media, middle ear effusion, mastoiditis  Anticipate ultimate dispo to be d/c with ENT f/u and supportive care  Amount and/or Complexity of Data Reviewed  Review and summarize past medical records: yes    Patient Progress  Patient progress: stable      Disposition  Final diagnoses:   Mastoiditis of right side   Right otitis media   Ear pain     Time reflects when diagnosis was documented in both MDM as applicable and the Disposition within this note     Time User Action Codes Description Comment    8/12/2022  4:19 AM Susan Bach Add [H70 91] Mastoiditis of right side     8/12/2022  4:19 AM Susan Bach Add [H66 91] Right otitis media     8/12/2022  4:19 AM Susan Bach Add [H92 09] Ear pain       ED Disposition     ED Disposition   Admit    Condition   Stable    Date/Time   Fri Aug 12, 2022  4:19 AM    Comment              Follow-up Information     Follow up With Specialties Details Why Contact Info Additional Information    Nehemias Coburn MD Family Medicine Call in 1 day To recheck symptoms and follow up on your ER visit [facility] [address] [place] [address]  [address] [facility] ENT Nicolasa Corbett Otolaryngology Call in 1 day For specialty evaluation and/or treatment [employer]  [address] [facility]  [address] [place] ENT Fabienne Arzola [email or web address] [facility], [address] [place] [address], [id number], [phone]          Patient's Medications   Discharge Prescriptions    No medications on file       No discharge procedures on file      PDMP Review     None          ED Provider  Electronically Signed by           Jayda Tomlinson DO  08/12/22 0668

## 2022-08-13 LAB
GLUCOSE SERPL-MCNC: 103 MG/DL (ref 65–140)
GLUCOSE SERPL-MCNC: 124 MG/DL (ref 65–140)
GLUCOSE SERPL-MCNC: 99 MG/DL (ref 65–140)
MRSA NOSE QL CULT: NORMAL
VANCOMYCIN TROUGH SERPL-MCNC: 12.9 UG/ML (ref 10–20)

## 2022-08-13 PROCEDURE — 82948 REAGENT STRIP/BLOOD GLUCOSE: CPT

## 2022-08-13 PROCEDURE — 99232 SBSQ HOSP IP/OBS MODERATE 35: CPT

## 2022-08-13 PROCEDURE — 80202 ASSAY OF VANCOMYCIN: CPT | Performed by: INTERNAL MEDICINE

## 2022-08-13 RX ORDER — INSULIN LISPRO 100 [IU]/ML
1-5 INJECTION, SOLUTION INTRAVENOUS; SUBCUTANEOUS
Status: DISCONTINUED | OUTPATIENT
Start: 2022-08-13 | End: 2022-08-14 | Stop reason: HOSPADM

## 2022-08-13 RX ADMIN — CEFEPIME HYDROCHLORIDE 2000 MG: 2 INJECTION, SOLUTION INTRAVENOUS at 14:09

## 2022-08-13 RX ADMIN — VANCOMYCIN HYDROCHLORIDE 1500 MG: 1 INJECTION, POWDER, LYOPHILIZED, FOR SOLUTION INTRAVENOUS at 16:22

## 2022-08-13 RX ADMIN — ACETAMINOPHEN 975 MG: 325 TABLET, FILM COATED ORAL at 14:09

## 2022-08-13 RX ADMIN — ENOXAPARIN SODIUM 40 MG: 40 INJECTION SUBCUTANEOUS at 17:09

## 2022-08-13 RX ADMIN — VANCOMYCIN HYDROCHLORIDE 2000 MG: 1 INJECTION, POWDER, LYOPHILIZED, FOR SOLUTION INTRAVENOUS at 21:30

## 2022-08-13 RX ADMIN — CEFEPIME HYDROCHLORIDE 2000 MG: 2 INJECTION, SOLUTION INTRAVENOUS at 05:40

## 2022-08-13 RX ADMIN — ACETAMINOPHEN 975 MG: 325 TABLET, FILM COATED ORAL at 05:40

## 2022-08-13 RX ADMIN — ACETAMINOPHEN 975 MG: 325 TABLET, FILM COATED ORAL at 21:09

## 2022-08-13 RX ADMIN — VANCOMYCIN HYDROCHLORIDE 1500 MG: 1 INJECTION, POWDER, LYOPHILIZED, FOR SOLUTION INTRAVENOUS at 01:07

## 2022-08-13 RX ADMIN — VANCOMYCIN HYDROCHLORIDE 1500 MG: 1 INJECTION, POWDER, LYOPHILIZED, FOR SOLUTION INTRAVENOUS at 08:02

## 2022-08-13 RX ADMIN — ENOXAPARIN SODIUM 40 MG: 40 INJECTION SUBCUTANEOUS at 08:01

## 2022-08-13 RX ADMIN — CEFEPIME HYDROCHLORIDE 2000 MG: 2 INJECTION, SOLUTION INTRAVENOUS at 21:09

## 2022-08-13 NOTE — ASSESSMENT & PLAN NOTE
· Updated HgbA1c: 6 2  · Encourage weightloss/lifestyle modifications  · Initiate Acc-Checks and SSI AC/HS while admitted  · Will initiate carb controlled diet  · Recommend outpatient follow up with PCP for further management

## 2022-08-13 NOTE — PROGRESS NOTES
[facility]  Progress Note - Saeed Taylor 1985, 30 y o  male MRN: [id number]  Unit/Bed#: -[facility] Encounter: [id number]  Primary Care Provider: Mary Brady MD   Date and time admitted to hospital: 8/12/2022  2:27 AM    * Acute mastoiditis of right side  Assessment & Plan  Patient presents with worsening right ear pain after receiving Augmentin from PCP for right acute otitis media on 08/06/22  Of note, patient also seen in the ED 07/20/22 for right sided facial droop and decreased sensation on right side of the face and was diagnosed with [place] Palsy and discharged with acyclovir x 7 days and prednisone x 4 days  · CT shows right otomastoiditis  · Patient remians afebrile and without leukocytosis  · Patient currently without evidence of facial asymmetry, no rash on the right side of the face or ear    · Prior provided Discussed with ENT on call, no need to transfer for ENT evaluation at this time  · C/w IV Cefepime and IV Vancomycin   · MRSA Swab pending  · Continue Supportive care  · Pain management   · If no improvement from IV abx, patient may require transfer for ENT intervention     Morbid obesity ([address] )  Assessment & Plan  · Updated HgbA1c: 6 2  · Encourage weightloss/lifestyle modifications  · Initiate Acc-Checks and SSI AC/HS while admitted  · Will initiate carb controlled diet  · Recommend outpatient follow up with PCP for further management      VTE Pharmacologic Prophylaxis: VTE Score: 3 Moderate Risk (Score 3-4) - Pharmacological DVT Prophylaxis Ordered: enoxaparin (Lovenox)  Patient Centered Rounds: I performed bedside rounds with nursing staff today  Discussions with Specialists or Other Care Team Provider: None    Education and Discussions with Family / Patient: Patient declined call to   Time Spent for Care: 30 minutes  More than 50% of total time spent on counseling and coordination of care as described above      Current Length of Stay: 1 day(s)  Current Patient Status: Inpatient   Certification Statement: The patient will continue to require additional inpatient hospital stay due to mastoiditis, requring IV anitbioitics and pain management  Discharge Plan: Anticipate discharge in 24-48 hrs to home  Code Status: Level 1 - Full Code    Subjective:   Patient complaining of right ear pain and tenderness  Improved since admission but still uncomfortable  Patient also stated he started with this sensation of water in his ear this morning  Patient denies any chest pain, shortness of breath, abdominal pain, nausea or vomiting  Objective:     Vitals:   Temp (24hrs), Av 2 °F (36 8 °C), Min:98 °F (36 7 °C), Max:98 3 °F (36 8 °C)    Temp:  [98 °F (36 7 °C)-98 3 °F (36 8 °C)] 98 °F (36 7 °C)  HR:  [63-74] 74  Resp:  [16-18] 18  BP: (100-117)/(59-74) 117/74  SpO2:  [96 %-97 %] 97 %  Body mass index is 45 58 kg/m²  Input and Output Summary (last 24 hours): Intake/Output Summary (Last 24 hours) at 2022 1153  Last data filed at 2022 0351  Gross per 24 hour   Intake 360 ml   Output --   Net 360 ml       Physical Exam:   Physical Exam  Vitals and nursing note reviewed  Constitutional:       General: He is not in acute distress  Appearance: Normal appearance  He is obese  HENT:      Head: Normocephalic  Nose: Nose normal  No congestion  Mouth/Throat:      Mouth: Mucous membranes are moist       Pharynx: Oropharynx is clear  Cardiovascular:      Rate and Rhythm: Normal rate and regular rhythm  Pulses: Normal pulses  Heart sounds: Normal heart sounds  No murmur heard  Pulmonary:      Effort: Pulmonary effort is normal  No respiratory distress  Breath sounds: Normal breath sounds  Abdominal:      General: Abdomen is flat  Bowel sounds are normal  There is no distension  Palpations: Abdomen is soft  Tenderness: There is no abdominal tenderness  Musculoskeletal:         General: No swelling  Normal range of motion  Cervical back: Normal range of motion  No tenderness  Right lower leg: No edema  Left lower leg: No edema  Skin:     General: Skin is warm and dry  Capillary Refill: Capillary refill takes less than 2 seconds  Neurological:      General: No focal deficit present  Mental Status: He is alert and oriented to person, place, and time  Mental status is at baseline  Motor: No weakness  Psychiatric:         Attention and Perception: Attention normal          Mood and Affect: Mood normal          Speech: Speech normal          Behavior: Behavior normal  Behavior is cooperative  Thought Content:  Thought content normal          Judgment: Judgment normal           Additional Data:     Labs:  Results from last 7 days   Lab Units 08/12/22  0515   WBC Thousand/uL 9 87   HEMOGLOBIN g/dL 12 7   HEMATOCRIT % 37 7   PLATELETS Thousands/uL 301   NEUTROS PCT % 59   LYMPHS PCT % 27   MONOS PCT % 9   EOS PCT % 3     Results from last 7 days   Lab Units 08/12/22  0515 08/12/22  0424   SODIUM mmol/L 139 137   POTASSIUM mmol/L 4 0 4 1   CHLORIDE mmol/L 103 106   CO2 mmol/L 26 23   BUN mg/dL 17 16   CREATININE mg/dL 0 95 0 81   ANION GAP mmol/L 10 8   CALCIUM mg/dL 9 3 7 9*   ALBUMIN g/dL  --  3 5   TOTAL BILIRUBIN mg/dL  --  0 28   ALK PHOS U/L  --  61   ALT U/L  --  19   AST U/L  --  20   GLUCOSE RANDOM mg/dL 104 96             Results from last 7 days   Lab Units 08/12/22  0424   HEMOGLOBIN A1C % 6 2*           Lines/Drains:  Invasive Devices  Report    Peripheral Intravenous Line  Duration           Peripheral IV 08/12/22 Right Antecubital 1 day                      Imaging: Reviewed radiology reports from this admission including: CT    Recent Cultures (last 7 days):         Last 24 Hours Medication List:   Current Facility-Administered Medications   Medication Dose Route Frequency Provider Last Rate    acetaminophen  975 mg Oral Q8H Natty Loera PA-C      cefepime  2,000 mg Intravenous Q8H Ivan Feathers, PA-C 2,000 mg (08/13/22 0540)    enoxaparin  40 mg Subcutaneous BID Ivan Feathers, PA-C      HYDROmorphone  1 mg Intravenous Q4H PRN Ivan Feathers, PA-C      insulin lispro  1-5 Units Subcutaneous TID AC JAVI Zeng      insulin lispro  1-5 Units Subcutaneous HS JAVI Summers      oxyCODONE  10 mg Oral Q4H PRN Ivan Feathers, PA-C      oxyCODONE  5 mg Oral Q4H PRN Ivan Feathers, PA-C      vancomycin  15 mg/kg (Adjusted) Intravenous Kiya Adams PA-C          Today, Patient Was Seen By: JAVI Summers    **Please Note: This note may have been constructed using a voice recognition system  **

## 2022-08-13 NOTE — PLAN OF CARE
Problem: PAIN - ADULT  Goal: Verbalizes/displays adequate comfort level or baseline comfort level  Description: Interventions:  - Encourage patient to monitor pain and request assistance  - Assess pain using appropriate pain scale  - Administer analgesics based on type and severity of pain and evaluate response  - Implement non-pharmacological measures as appropriate and evaluate response  - Consider cultural and social influences on pain and pain management  - Notify physician/advanced practitioner if interventions unsuccessful or patient reports new pain  Outcome: Not Progressing     Problem: INFECTION - ADULT  Goal: Absence or prevention of progression during hospitalization  Description: INTERVENTIONS:  - Assess and monitor for signs and symptoms of infection  - Monitor lab/diagnostic results  - Monitor all insertion sites, i e  indwelling lines, tubes, and drains  - Monitor endotracheal if appropriate and nasal secretions for changes in amount and color  - [place] appropriate cooling/warming therapies per order  - Administer medications as ordered  - Instruct and encourage patient and family to use good hand hygiene technique  - Identify and instruct in appropriate isolation precautions for identified infection/condition  Outcome: Not Progressing  Goal: Absence of fever/infection during neutropenic period  Description: INTERVENTIONS:  - Monitor WBC    Outcome: Not Progressing     Problem: DISCHARGE PLANNING  Goal: Discharge to home or other facility with appropriate resources  Description: INTERVENTIONS:  - Identify barriers to discharge w/patient and caregiver  - Arrange for needed discharge resources and transportation as appropriate  - Identify discharge learning needs (meds, wound care, etc )  - Arrange for interpretive services to assist at discharge as needed  - Refer to Case Management Department for coordinating discharge planning if the patient needs post-hospital services based on physician/advanced practitioner order or complex needs related to functional status, cognitive ability, or social support system  Outcome: Not Progressing

## 2022-08-13 NOTE — ASSESSMENT & PLAN NOTE
Patient presents with worsening right ear pain after receiving Augmentin from PCP for right acute otitis media on 08/06/22  Of note, patient also seen in the ED 07/20/22 for right sided facial droop and decreased sensation on right side of the face and was diagnosed with [place] Palsy and discharged with acyclovir x 7 days and prednisone x 4 days     · CT shows right otomastoiditis  · Patient remians afebrile and without leukocytosis  · Patient currently without evidence of facial asymmetry, no rash on the right side of the face or ear    · Prior provided Discussed with ENT on call, no need to transfer for ENT evaluation at this time  · C/w IV Cefepime and IV Vancomycin   · MRSA Swab pending  · Continue Supportive care  · Pain management   · If no improvement from IV abx, patient may require transfer for ENT intervention

## 2022-08-13 NOTE — PLAN OF CARE
Problem: PAIN - ADULT  Goal: Verbalizes/displays adequate comfort level or baseline comfort level  Description: Interventions:  - Encourage patient to monitor pain and request assistance  - Assess pain using appropriate pain scale  - Administer analgesics based on type and severity of pain and evaluate response  - Implement non-pharmacological measures as appropriate and evaluate response  - Consider cultural and social influences on pain and pain management  - Notify physician/advanced practitioner if interventions unsuccessful or patient reports new pain  Outcome: Progressing     Problem: INFECTION - ADULT  Goal: Absence or prevention of progression during hospitalization  Description: INTERVENTIONS:  - Assess and monitor for signs and symptoms of infection  - Monitor lab/diagnostic results  - Monitor all insertion sites, i e  indwelling lines, tubes, and drains  - Monitor endotracheal if appropriate and nasal secretions for changes in amount and color  - [place] appropriate cooling/warming therapies per order  - Administer medications as ordered  - Instruct and encourage patient and family to use good hand hygiene technique  - Identify and instruct in appropriate isolation precautions for identified infection/condition  Outcome: Progressing     Problem: SAFETY ADULT  Goal: Patient will remain free of falls  Description: INTERVENTIONS:  - Educate patient/family on patient safety including physical limitations  - Instruct patient to call for assistance with activity   - Consult OT/PT to assist with strengthening/mobility   - Keep Call bell within reach  - Keep bed low and locked with side rails adjusted as appropriate  - Keep care items and personal belongings within reach  - Initiate and maintain comfort rounds  - Make Fall Risk Sign visible to staff  - Offer Toileting every 3Hours, in advance of need  - Initiate/Maintain bedalarm  - Obtain necessary fall risk management equipment:   - Apply yellow socks and bracelet for high fall risk patients  - Consider moving patient to room near nurses station  Outcome: Progressing     Problem: DISCHARGE PLANNING  Goal: Discharge to home or other facility with appropriate resources  Description: INTERVENTIONS:  - Identify barriers to discharge w/patient and caregiver  - Arrange for needed discharge resources and transportation as appropriate  - Identify discharge learning needs (meds, wound care, etc )  - Arrange for interpretive services to assist at discharge as needed  - Refer to Case Management Department for coordinating discharge planning if the patient needs post-hospital services based on physician/advanced practitioner order or complex needs related to functional status, cognitive ability, or social support system  Outcome: Progressing

## 2022-08-13 NOTE — PROGRESS NOTES
Vancomycin Assessment    Amanuel Smith is a 30 y o  male who is currently receiving vancomycin 1500mg IV q8hrs for other mastoiditis  Vancomycin goal trough is 15-20  Vancomycin trough today was 12 9 ug/mL (subtherapeutic)  Relevant clinical data and objective history reviewed:  Creatinine   Date Value Ref Range Status   08/12/2022 0 95 0 60 - 1 30 mg/dL Final     Comment:     Standardized to IDMS reference method   08/12/2022 0 81 0 60 - 1 30 mg/dL Final     Comment:     Standardized to IDMS reference method   07/20/2022 0 93 0 60 - 1 30 mg/dL Final     Comment:     Standardized to IDMS reference method     /71 (BP Location: Left arm)   Pulse 71   Temp 99 2 °F (37 3 °C) (Tympanic)   Resp 18   Ht 5' 7" (1 702 m)   Wt 132 kg (291 lb)   SpO2 96%   BMI 45 58 kg/m²   I/O last 3 completed shifts: In: 710 [P O :710]  Out: 2 [Urine:2]  Lab Results   Component Value Date/Time    BUN 17 08/12/2022 05:15 AM    WBC 9 87 08/12/2022 05:15 AM    HGB 12 7 08/12/2022 05:15 AM    HCT 37 7 08/12/2022 05:15 AM    MCV 88 08/12/2022 05:15 AM     08/12/2022 05:15 AM     Temp Readings from Last 3 Encounters:   08/13/22 99 2 °F (37 3 °C) (Tympanic)   08/06/22 98 1 °F (36 7 °C)   07/20/22 98 °F (36 7 °C) (Oral)     Vancomycin Days of Therapy: 2    Assessment/Plan  The patient is currently on vancomycin utilizing scheduled dosing  Baseline risks associated with therapy include:  none   The patient is receiving 1500mg IV q8hrs with the most recent vancomycin level being at steady-state and sub-therapeutic based on a goal of 15-20 (appropriate for most indications) ; therefore, after clinical evaluation will be changed to 2000mg IV q8hrs   Pharmacy will continue to follow closely for s/sx of nephrotoxicity, infusion reactions, and appropriateness of therapy  BMP and CBC will be ordered per protocol  Plan for trough as patient approaches steady state, prior to the 4th  dose at approximately 2130 on 08/14/22  Pharmacy will continue to follow the patients culture results and clinical progress daily      Keo Gooden, Pharmacist

## 2022-08-14 VITALS
DIASTOLIC BLOOD PRESSURE: 81 MMHG | HEIGHT: 67 IN | TEMPERATURE: 97.2 F | HEART RATE: 59 BPM | RESPIRATION RATE: 18 BRPM | WEIGHT: 291 LBS | SYSTOLIC BLOOD PRESSURE: 121 MMHG | BODY MASS INDEX: 45.67 KG/M2 | OXYGEN SATURATION: 98 %

## 2022-08-14 LAB
ANION GAP SERPL CALCULATED.3IONS-SCNC: 10 MMOL/L (ref 4–13)
BASOPHILS # BLD AUTO: 0.07 THOUSANDS/ΜL (ref 0–0.1)
BASOPHILS NFR BLD AUTO: 1 % (ref 0–1)
BUN SERPL-MCNC: 13 MG/DL (ref 5–25)
CALCIUM SERPL-MCNC: 9.5 MG/DL (ref 8.4–10.2)
CHLORIDE SERPL-SCNC: 102 MMOL/L (ref 96–108)
CO2 SERPL-SCNC: 26 MMOL/L (ref 21–32)
CREAT SERPL-MCNC: 0.87 MG/DL (ref 0.6–1.3)
EOSINOPHIL # BLD AUTO: 0.41 THOUSAND/ΜL (ref 0–0.61)
EOSINOPHIL NFR BLD AUTO: 5 % (ref 0–6)
ERYTHROCYTE [DISTWIDTH] IN BLOOD BY AUTOMATED COUNT: 13.2 % (ref 11.6–15.1)
GFR SERPL CREATININE-BSD FRML MDRD: 111 ML/MIN/1.73SQ M
GLUCOSE SERPL-MCNC: 84 MG/DL (ref 65–140)
GLUCOSE SERPL-MCNC: 87 MG/DL (ref 65–140)
GLUCOSE SERPL-MCNC: 99 MG/DL (ref 65–140)
HCT VFR BLD AUTO: 42.2 % (ref 36.5–49.3)
HGB BLD-MCNC: 14.2 G/DL (ref 12–17)
IMM GRANULOCYTES # BLD AUTO: 0.08 THOUSAND/UL (ref 0–0.2)
IMM GRANULOCYTES NFR BLD AUTO: 1 % (ref 0–2)
LYMPHOCYTES # BLD AUTO: 3.15 THOUSANDS/ΜL (ref 0.6–4.47)
LYMPHOCYTES NFR BLD AUTO: 34 % (ref 14–44)
MCH RBC QN AUTO: 29.4 PG (ref 26.8–34.3)
MCHC RBC AUTO-ENTMCNC: 33.6 G/DL (ref 31.4–37.4)
MCV RBC AUTO: 87 FL (ref 82–98)
MONOCYTES # BLD AUTO: 0.73 THOUSAND/ΜL (ref 0.17–1.22)
MONOCYTES NFR BLD AUTO: 8 % (ref 4–12)
NEUTROPHILS # BLD AUTO: 4.73 THOUSANDS/ΜL (ref 1.85–7.62)
NEUTS SEG NFR BLD AUTO: 51 % (ref 43–75)
NRBC BLD AUTO-RTO: 0 /100 WBCS
PLATELET # BLD AUTO: 323 THOUSANDS/UL (ref 149–390)
PMV BLD AUTO: 9.2 FL (ref 8.9–12.7)
POTASSIUM SERPL-SCNC: 4 MMOL/L (ref 3.5–5.3)
RBC # BLD AUTO: 4.83 MILLION/UL (ref 3.88–5.62)
SODIUM SERPL-SCNC: 138 MMOL/L (ref 135–147)
WBC # BLD AUTO: 9.17 THOUSAND/UL (ref 4.31–10.16)

## 2022-08-14 PROCEDURE — 85025 COMPLETE CBC W/AUTO DIFF WBC: CPT

## 2022-08-14 PROCEDURE — 80048 BASIC METABOLIC PNL TOTAL CA: CPT

## 2022-08-14 PROCEDURE — 99239 HOSP IP/OBS DSCHRG MGMT >30: CPT

## 2022-08-14 PROCEDURE — 82948 REAGENT STRIP/BLOOD GLUCOSE: CPT

## 2022-08-14 RX ORDER — ACETAMINOPHEN 325 MG/1
975 TABLET ORAL EVERY 8 HOURS
Refills: 0
Start: 2022-08-14

## 2022-08-14 RX ORDER — CEFDINIR 300 MG/1
300 CAPSULE ORAL EVERY 12 HOURS SCHEDULED
Qty: 14 CAPSULE | Refills: 0 | Status: SHIPPED | OUTPATIENT
Start: 2022-08-14 | End: 2022-08-21

## 2022-08-14 RX ADMIN — ENOXAPARIN SODIUM 40 MG: 40 INJECTION SUBCUTANEOUS at 09:18

## 2022-08-14 RX ADMIN — CEFEPIME HYDROCHLORIDE 2000 MG: 2 INJECTION, SOLUTION INTRAVENOUS at 04:37

## 2022-08-14 RX ADMIN — VANCOMYCIN HYDROCHLORIDE 2000 MG: 1 INJECTION, POWDER, LYOPHILIZED, FOR SOLUTION INTRAVENOUS at 06:16

## 2022-08-14 RX ADMIN — ACETAMINOPHEN 975 MG: 325 TABLET, FILM COATED ORAL at 04:37

## 2022-08-14 NOTE — ASSESSMENT & PLAN NOTE
Patient presents with worsening right ear pain after receiving Augmentin from PCP for right acute otitis media on 08/06/22  Of note, patient also seen in the ED 07/20/22 for right sided facial droop and decreased sensation on right side of the face and was diagnosed with [place] Palsy and discharged with acyclovir x 7 days and prednisone x 4 days     · CT shows right otomastoiditis  · Patient remians afebrile and without leukocytosis  · Patient currently without evidence of facial asymmetry, no rash on the right side of the face or ear    · Prior provided Discussed with ENT on call, no need to transfer for ENT evaluation at this time  · Completed 3 Days of IV Cefepime and IV Vancomycin   · MRSA Swab Negative  · Will transition to PO Cefdinir for a total of 10 days of abx therapy  · Continue Supportive care  · Pain management   · Will place ambulatory referral for ENT on discharge

## 2022-08-14 NOTE — PLAN OF CARE
Problem: PAIN - ADULT  Goal: Verbalizes/displays adequate comfort level or baseline comfort level  Description: Interventions:  - Encourage patient to monitor pain and request assistance  - Assess pain using appropriate pain scale  - Administer analgesics based on type and severity of pain and evaluate response  - Implement non-pharmacological measures as appropriate and evaluate response  - Consider cultural and social influences on pain and pain management  - Notify physician/advanced practitioner if interventions unsuccessful or patient reports new pain  Outcome: Progressing     Problem: INFECTION - ADULT  Goal: Absence or prevention of progression during hospitalization  Description: INTERVENTIONS:  - Assess and monitor for signs and symptoms of infection  - Monitor lab/diagnostic results  - Monitor all insertion sites, i e  indwelling lines, tubes, and drains  - Monitor endotracheal if appropriate and nasal secretions for changes in amount and color  - [place] appropriate cooling/warming therapies per order  - Administer medications as ordered  - Instruct and encourage patient and family to use good hand hygiene technique  - Identify and instruct in appropriate isolation precautions for identified infection/condition  Outcome: Progressing     Problem: SAFETY ADULT  Goal: Patient will remain free of falls  Description: INTERVENTIONS:  - Educate patient/family on patient safety including physical limitations  - Instruct patient to call for assistance with activity   - Consult OT/PT to assist with strengthening/mobility   - Keep Call bell within reach  - Keep bed low and locked with side rails adjusted as appropriate  - Keep care items and personal belongings within reach  - Initiate and maintain comfort rounds  - Make Fall Risk Sign visible to staff        - Apply yellow socks and bracelet for high fall risk patients  - Consider moving patient to room near nurses station  Outcome: Progressing     Problem: DISCHARGE PLANNING  Goal: Discharge to home or other facility with appropriate resources  Description: INTERVENTIONS:  - Identify barriers to discharge w/patient and caregiver  - Arrange for needed discharge resources and transportation as appropriate  - Identify discharge learning needs (meds, wound care, etc )  - Arrange for interpretive services to assist at discharge as needed  - Refer to Case Management Department for coordinating discharge planning if the patient needs post-hospital services based on physician/advanced practitioner order or complex needs related to functional status, cognitive ability, or social support system  Outcome: Progressing

## 2022-08-14 NOTE — PLAN OF CARE
Problem: PAIN - ADULT  Goal: Verbalizes/displays adequate comfort level or baseline comfort level  Description: Interventions:  - Encourage patient to monitor pain and request assistance  - Assess pain using appropriate pain scale  - Administer analgesics based on type and severity of pain and evaluate response  - Implement non-pharmacological measures as appropriate and evaluate response  - Consider cultural and social influences on pain and pain management  - Notify physician/advanced practitioner if interventions unsuccessful or patient reports new pain  Outcome: Progressing     Problem: INFECTION - ADULT  Goal: Absence or prevention of progression during hospitalization  Description: INTERVENTIONS:  - Assess and monitor for signs and symptoms of infection  - Monitor lab/diagnostic results  - Monitor all insertion sites, i e  indwelling lines, tubes, and drains  - Monitor endotracheal if appropriate and nasal secretions for changes in amount and color  - [place] appropriate cooling/warming therapies per order  - Administer medications as ordered  - Instruct and encourage patient and family to use good hand hygiene technique  - Identify and instruct in appropriate isolation precautions for identified infection/condition  Outcome: Progressing     Problem: SAFETY ADULT  Goal: Patient will remain free of falls  Description: INTERVENTIONS:  - Educate patient/family on patient safety including physical limitations  - Instruct patient to call for assistance with activity   - Consult OT/PT to assist with strengthening/mobility   - Keep Call bell within reach  - Keep bed low and locked with side rails adjusted as appropriate  - Keep care items and personal belongings within reach  - Initiate and maintain comfort rounds  - Offer Toileting every 2 Hours, in advance of need  - Apply yellow socks and bracelet for high fall risk patients  - Consider moving patient to room near nurses station  Outcome: Progressing

## 2022-08-14 NOTE — ASSESSMENT & PLAN NOTE
· Updated HgbA1c: 6 2  · Encourage weightloss/lifestyle modifications  · Continue carb controlled diet  · Recommend outpatient follow up with PCP for further management

## 2022-08-14 NOTE — DISCHARGE SUMMARY
[address]  Discharge- Linda Starr 1985, 30 y o  male MRN: [id number]  Unit/Bed#: -[facility] Encounter: [id number]  Primary Care Provider: Eden Perera MD   Date and time admitted to hospital: 8/12/2022  2:27 AM    * Acute mastoiditis of right side  Assessment & Plan  Patient presents with worsening right ear pain after receiving Augmentin from PCP for right acute otitis media on 08/06/22  Of note, patient also seen in the ED 07/20/22 for right sided facial droop and decreased sensation on right side of the face and was diagnosed with [place] Palsy and discharged with acyclovir x 7 days and prednisone x 4 days     · CT shows right otomastoiditis  · Patient remians afebrile and without leukocytosis  · Patient currently without evidence of facial asymmetry, no rash on the right side of the face or ear    · Prior provided Discussed with ENT on call, no need to transfer for ENT evaluation at this time  · Completed 3 Days of IV Cefepime and IV Vancomycin   · MRSA Swab Negative  · Will transition to PO Cefdinir for a total of 10 days of abx therapy  · Continue Supportive care  · Pain management   · Will place ambulatory referral for ENT on discharge    Morbid obesity ([address] )  Assessment & Plan  · Updated HgbA1c: 6 2  · Encourage weightloss/lifestyle modifications  · Continue carb controlled diet  · Recommend outpatient follow up with PCP for further management    Medical Problems             Resolved Problems  Date Reviewed: 8/14/2022   None               Discharging Physician / Practitioner: Lieutenant Hatchet, CRNP  PCP: Eden Perera MD  Admission Date:   Admission Orders (From admission, onward)     Ordered        08/12/22 0419  INPATIENT ADMISSION  Once                      Discharge Date: 08/14/22    Consultations During Hospital Stay:  · None    Procedures Performed:   · None    Significant Findings / Test Results:     CT orbits/temporal bones/skull base w contrast   Final Result by Vera Sorenson Nito Cárdenas MD (08/12 0400)      Partial opacification of the right mastoid air cells and fluid within the right middle ear cavity compatible with otomastoiditis  Workstation performed: [id number]             Incidental Findings:   · CT Orbits:   · Partial opacification of the right mastoid air cells and fluid within the right middle ear cavity compatible with otomastoiditis    Test Results Pending at Discharge (will require follow up): · None     Outpatient Tests Requested:  · Recommend outpatient follow up with PCP  · Recommend outpatient follow up with ENT    Complications:  None    Reason for Admission: Acute Mastoiditis    Hospital Course:   Delgado Hastings is a 30 y o  male patient with a PMH of recently diagnosed [place] Palsy who presents with worsening right ear pain after receiving Augmentin from PCP for right acute otitis media on 08/06/22  Patient reports that he took his antibiotics as prescribed  He states the pain is currently on the back of his earwho originally presented to the hospital on 8/12/2022 due to acute mastoiditis  Of note, patient was recently seen in the ED 07/20/2022 for right sided facial droop and decreased sensation on the right side of the face and was diagnosed with [place] Palsy and discharged with acyclovir x 7 days and prednisone x 4 days which he states he completed  Patient denies any issues with facial droop or decreased sensation of the right side of his face since then  Upon arrival to the ED, CT of the orbits was performed and revealed Partial opacification of the right mastoid air cells and fluid within the right middle ear cavity compatible with otomastoiditis  Patient was started on IV antibiotics with Vancomycin and Cefepime  Patients MRSA swab was negative and IV vancomycin was discontinued  Patient completed a total of 3 days of IV antibiotics while admitted  Patient will be discharge with Oral Cefdinir for a total of 10 days of antibiotic therapy   Patient with improved pain since admission, continue tylenol for pain management  Recommend outpatient follow up with ENT for further evaluation upon discharge  While admitted patient with mildly elevate HgbA1c of 6 2  Discussed with importance of lifestyle modifications including exercise, and diet adjustment  Recommend close outpatient follow up with PCP for further management  Patient also noted to have concerns for sleep apnea during admission  Recommend close outpatient follow up with PCP for further evaluation and potential sleep study in the future  Patient medically stable for discharge with outpatient follow up  Discussed with patient and patient's wife regarding plan  All questions were answered  Please see above list of diagnoses and related plan for additional information  Condition at Discharge: stable    Discharge Day Visit / Exam:   Subjective:  Patient stated that when the tylenol wears off, he clearance is pain but it has improved since admission  Patient denies any active chest pain, shortness a breath, abdominal pain, nausea, vomiting, or diarrhea  Vitals: Blood Pressure: 121/81 (08/14/22 0713)  Pulse: 59 (08/14/22 0713)  Temperature: (!) 97 2 °F (36 2 °C) (08/14/22 0713)  Temp Source: Tympanic (08/14/22 0713)  Respirations: 18 (08/14/22 0713)  Height: 5' 7" (170 2 cm) (08/12/22 0230)  Weight - Scale: 132 kg (291 lb) (08/13/22 0600)  SpO2: 98 % (08/14/22 0713)  Exam:   Physical Exam  Vitals and nursing note reviewed  Constitutional:       General: He is not in acute distress  Appearance: He is obese  HENT:      Head: Normocephalic  Nose: Nose normal  No congestion  Mouth/Throat:      Mouth: Mucous membranes are moist       Pharynx: Oropharynx is clear  Cardiovascular:      Rate and Rhythm: Normal rate and regular rhythm  Pulses: Normal pulses  Heart sounds: No murmur heard  Pulmonary:      Effort: Pulmonary effort is normal  No respiratory distress        Breath sounds: Decreased breath sounds present  Abdominal:      General: Bowel sounds are normal  There is no distension  Palpations: Abdomen is soft  Tenderness: There is no abdominal tenderness  Musculoskeletal:         General: Normal range of motion  Cervical back: Normal range of motion  Skin:     General: Skin is warm and dry  Capillary Refill: Capillary refill takes less than 2 seconds  Neurological:      Mental Status: He is alert and oriented to person, place, and time  Mental status is at baseline  Motor: No weakness  Psychiatric:         Mood and Affect: Mood normal          Speech: Speech normal          Behavior: Behavior is cooperative  Judgment: Judgment normal           Discussion with Family: Updated  (wife) via phone  Discharge instructions/Information to patient and family:   See after visit summary for information provided to patient and family  Provisions for Follow-Up Care:  See after visit summary for information related to follow-up care and any pertinent home health orders  Disposition:   Home    Planned Readmission: No     Discharge Statement:  I spent 60 minutes discharging the patient  This time was spent on the day of discharge  I had direct contact with the patient on the day of discharge  Greater than 50% of the total time was spent examining patient, answering all patient questions, arranging and discussing plan of care with patient as well as directly providing post-discharge instructions  Additional time then spent on discharge activities  Discharge Medications:  See after visit summary for reconciled discharge medications provided to patient and/or family        **Please Note: This note may have been constructed using a voice recognition system**

## 2022-08-15 ENCOUNTER — TRANSITIONAL CARE MANAGEMENT (OUTPATIENT)
Dept: FAMILY MEDICINE CLINIC | Facility: CLINIC | Age: 37
End: 2022-08-15

## 2022-08-15 NOTE — UTILIZATION REVIEW
Initial Clinical Review    Admission: Date/Time/Statement:   Admission Orders (From admission, onward)     Ordered        08/12/22 0419  INPATIENT ADMISSION  Once                      Orders Placed This Encounter   Procedures    INPATIENT ADMISSION     Standing Status:   Standing     Number of Occurrences:   1     Order Specific Question:   Level of Care     Answer:   Med Surg [16]     Order Specific Question:   Estimated length of stay     Answer:   More than 2 Midnights     Order Specific Question:   Certification     Answer:   I certify that inpatient services are medically necessary for this patient for a duration of greater than two midnights  See H&P and MD Progress Notes for additional information about the patient's course of treatment  ED Arrival Information     Expected   -    Arrival   8/12/2022 02:25    Acuity   Less Urgent            Means of arrival   Walk-In    Escorted by   Family Member    Service   Hospitalist    Admission type   Urgent            Arrival complaint   Earache           Chief Complaint   Patient presents with    Earache     Pt states he was seen at an urgent care for the same and is taking amoxicillin with no relief  Pt states it feels like he is underwater       Initial Presentation: 30 y o  male with PMH of recently diagnosed [place] Palsy who presents to the ED from home with worsening right ear pain after receiving Augmentin from PCP for right acute otitis media on 08/06/22  Patient reports that he took his antibiotics as prescribed  He states the pain is currently in the back of his ear  PE: TTP of right mastoid with some mild overlying erythema; no evidence of vesicular rash  8/12 Plan: Inpatient admission for evaluation and treatment of acute R mastoiditis:  IV cefepime and IV vancomycin, pain management  8/13 Internal Medicine: Patient complaining of right ear pain and tenderness  Improved since admission but still uncomfortable   Patient also stated he started with this sensation of water in his ear this morning  Continue IV abx, MRSA swab pending  HgbA1c 6 2, initial accu-checks and SSI while admitted, start carb controlled diet  8/14 Discharged to home/self care  ED Triage Vitals   Temperature Pulse Respirations Blood Pressure SpO2   08/12/22 0230 08/12/22 0230 08/12/22 0230 08/12/22 0230 08/12/22 0230   98 7 °F (37 1 °C) 83 16 145/88 98 %      Temp Source Heart Rate Source Patient Position - Orthostatic VS BP Location FiO2 (%)   08/12/22 0230 08/12/22 0430 08/12/22 0230 08/12/22 0230 --   Oral Monitor Sitting Right arm       Pain Score       08/12/22 0230       8          Wt Readings from Last 1 Encounters:   08/13/22 132 kg (291 lb)     Additional Vital Signs:         Date/Time Temp Pulse Resp BP MAP (mmHg) SpO2 O2 Device   08/14/22 0713 97 2 °F (36 2 °C) Abnormal  59 18 121/81 92 98 % None (Room air)   08/14/22 0054 98 2 °F (36 8 °C) 71 18 111/84 93 97 % None (Room air)   08/13/22 1450 99 2 °F (37 3 °C) 71 18 108/71 85 96 % None (Room air)   08/13/22 0713 98 °F (36 7 °C) 74 18 117/74 89 97 % None (Room air)   08/12/22 2210 -- 67 16 117/66 85 97 % --   08/12/22 1512 98 3 °F (36 8 °C) 63 16 100/59 -- 96 % --   08/12/22 0733 97 6 °F (36 4 °C) 63 16 94/63 -- 97 % --   08/12/22 0458 98 6 °F (37 °C) 71 16 106/66 -- 100 % None (Room air)   08/12/22 0430 -- 73 18 135/61 -- 98 % None (Room air)         Pertinent Labs/Diagnostic Test Results:       CT orbits/temporal bones/skull base w contrast   Final Result by Jesse Lewis MD (08/12 0400)      Partial opacification of the right mastoid air cells and fluid within the right middle ear cavity compatible with otomastoiditis              Results from last 7 days   Lab Units 08/14/22  0501 08/12/22  0515 08/12/22  0424   WBC Thousand/uL 9 17 9 87 9 72   HEMOGLOBIN g/dL 14 2 12 7 12 4   HEMATOCRIT % 42 2 37 7 36 3*   PLATELETS Thousands/uL 323 301 287   NEUTROS ABS Thousands/µL 4 73 6 01 6 06         Results from last 7 days   Lab Units 08/14/22  0501 08/12/22  0515 08/12/22  0424   SODIUM mmol/L 138 139 137   POTASSIUM mmol/L 4 0 4 0 4 1   CHLORIDE mmol/L 102 103 106   CO2 mmol/L 26 26 23   ANION GAP mmol/L 10 10 8   BUN mg/dL 13 17 16   CREATININE mg/dL 0 87 0 95 0 81   EGFR ml/min/1 73sq m 111 103 115   CALCIUM mg/dL 9 5 9 3 7 9*     Results from last 7 days   Lab Units 08/12/22  0424   AST U/L 20   ALT U/L 19   ALK PHOS U/L 61   TOTAL PROTEIN g/dL 6 1*   ALBUMIN g/dL 3 5   TOTAL BILIRUBIN mg/dL 0 28     Results from last 7 days   Lab Units 08/14/22  1125 08/14/22  0720 08/13/22  2115 08/13/22  1537 08/13/22  1205   POC GLUCOSE mg/dl 99 87 103 99 124     Results from last 7 days   Lab Units 08/14/22  0501 08/12/22  0515 08/12/22  0424   GLUCOSE RANDOM mg/dL 84 104 96         Results from last 7 days   Lab Units 08/12/22  0424   HEMOGLOBIN A1C % 6 2*   EAG mg/dl 131     ED Treatment:   Medication Administration from 08/12/2022 0225 to 08/12/2022 0452       Date/Time Order Dose Route Action     08/12/2022 0253 ketorolac (TORADOL) injection 30 mg 30 mg Intravenous Given     08/12/2022 0344 iohexol (OMNIPAQUE) 350 MG/ML injection (MULTI-DOSE) 100 mL 70 mL Intravenous Given     08/12/2022 0415 morphine injection 6 mg 6 mg Intravenous Given     08/12/2022 0426 oxymetazoline (AFRIN) 0 05 % nasal spray 2 spray 2 spray Each Nare Given        Past Medical History:   Diagnosis Date    Constipation     Hemorrhoids     Seasonal allergies      Present on Admission:   Morbid obesity ([address] )      Admitting Diagnosis: Ear pain [H92 09]  Earache [H92 09]  Right otitis media [H66 91]  Mastoiditis of right side [H70 91]  Age/Sex: 30 y o  male       Admission Orders: SCD      Scheduled Medications:    Medication Dose Route Frequency    acetaminophen  975 mg Oral Q8H    cefepime  2,000 mg Intravenous Q8H    enoxaparin  40 mg Subcutaneous BID    HYDROmorphone  1 mg Intravenous Q4H PRN    insulin lispro  1-5 Units Subcutaneous TID AC    insulin lispro  1-5 Units Subcutaneous HS    oxyCODONE  10 mg Oral Q4H PRN    oxyCODONE  5 mg Oral Q4H PRN    vancomycin  15 mg/kg (Adjusted) Intravenous Q8H         Network Utilization Review Department  ATTENTION: Please call with any questions or concerns to [phone] and carefully listen to the prompts so that you are directed to the right person  All voicemails are confidential   [facility] all requests for admission clinical reviews, approved or denied determinations and any other requests to dedicated fax number below belonging to the campus where the patient is receiving treatment   List of dedicated fax numbers for the Facilities:  [address] DENIALS (Administrative/Medical Necessity) [phone]   [address] (Maternity/NICU/Pediatrics) [phone]   [address]  [address] [facility] [place] [address] [address] [place] [address] [address] [place] [address] [phone]

## 2022-08-18 ENCOUNTER — OFFICE VISIT (OUTPATIENT)
Dept: FAMILY MEDICINE CLINIC | Facility: CLINIC | Age: 37
End: 2022-08-18

## 2022-08-18 VITALS
SYSTOLIC BLOOD PRESSURE: 120 MMHG | HEIGHT: 67 IN | BODY MASS INDEX: 46.3 KG/M2 | HEART RATE: 86 BPM | DIASTOLIC BLOOD PRESSURE: 76 MMHG | TEMPERATURE: 97.3 F | WEIGHT: 295 LBS | RESPIRATION RATE: 20 BRPM | OXYGEN SATURATION: 98 %

## 2022-08-18 DIAGNOSIS — E66.01 CLASS 3 SEVERE OBESITY DUE TO EXCESS CALORIES WITH SERIOUS COMORBIDITY AND BODY MASS INDEX (BMI) OF 45.0 TO 49.9 IN ADULT (HCC): ICD-10-CM

## 2022-08-18 DIAGNOSIS — M79.89 PAIN AND SWELLING OF TOE OF RIGHT FOOT: Primary | ICD-10-CM

## 2022-08-18 DIAGNOSIS — M79.674 PAIN AND SWELLING OF TOE OF RIGHT FOOT: Primary | ICD-10-CM

## 2022-08-18 PROCEDURE — 99496 TRANSJ CARE MGMT HIGH F2F 7D: CPT | Performed by: FAMILY MEDICINE

## 2022-08-18 NOTE — PROGRESS NOTES
FAMILY MEDICINE TRANSITION OF CARE OFFICE VISIT  [facility] - [facility] FAMILY MEDICINE    NAME: Delgado Hastings  AGE: 30 y o  SEX: male  : 1985       DATE: 2022    Assessment and Plan     Diagnoses and all orders for this visit:    Pain and swelling of toe of right foot  -     XR foot 3+ vw right; Future    Class 3 severe obesity due to excess calories with serious comorbidity and body mass index (BMI) of 45 0 to 49 9 in adult [facility])        - Counseling Documentation: patient was counseled regarding: diagnostic results, prognosis, patient and family education, impressions and risks and benefits of treatment options  - Counseling Time: counseling time more than 50% of visit: 20 minutes  - Barriers to treatment: lack of medical insurance  - Medication Side Effects: Adverse side effects of medications were reviewed with the patient/guardian today  - Self Referrals: Yes  BMI Counseling: Body mass index is 46 2 kg/m²  The BMI is above normal  Nutrition recommendations include decreasing portion sizes, encouraging healthy choices of fruits and vegetables, decreasing fast food intake, consuming healthier snacks, limiting drinks that contain sugar, moderation in carbohydrate intake and reducing intake of cholesterol  Exercise recommendations include moderate physical activity 150 minutes/week  No pharmacotherapy was ordered  Rationale for BMI follow-up plan is due to patient being overweight or obese  Advised to complete the course of omnicef, he failed augmentin prior to that  Advised to obtain xray of foot as above, ibuprofen prn for pain and elevate the leg using stool while sitting and pillows while laying, use ace wrap as demonstrated  to decrease foot ankle swelling while awake      Transitional Care Management Review     Delgado Hastings is a 30 y o  male here for TCM follow-up    During the TCM phone call patient stated:    TCM Call     Date and time call was made  8/15/2022 4:22 PM    Hospital care reviewed  Records reviewed    Patient was hospitialized at  [address]    Date of Admission  08/12/22    Date of discharge  08/14/22    Disposition  Home      TCM Call     None          History of Present Illness     TCM Call     Date and time call was made  8/15/2022  4:22 PM    Hospital care reviewed  Records reviewed    Patient was hospitialized at  [address] for acute mastoiditis and dizziness   Date of Admission  08/12/22    Date of discharge  08/14/22    Disposition  Home    denies any further ear pain and dizziness, has right lower extremity pain and swelling, states while he had ear infection and dizziness , imbalance prior to going to the hospital he had lost his balance and twisted his ankle, since then the ankle is swollen        The following portions of the patient's history were reviewed and updated as appropriate: allergies, current medications, past family history, past medical history, past social history, past surgical history and problem list     Review of Systems       Review of Systems   Constitutional: Negative for chills and fever  HENT: Negative for congestion, rhinorrhea and sore throat  Eyes: Negative for discharge, redness and itching  Respiratory: Negative for chest tightness, shortness of breath and wheezing  Cardiovascular: Negative for chest pain and palpitations  Gastrointestinal: Negative for abdominal pain, constipation and diarrhea  Genitourinary: Negative for dysuria  Musculoskeletal: Positive for arthralgias  Skin: Negative for pallor and rash  Neurological: Negative for dizziness, weakness, numbness and headaches         Active Problem List     Patient Active Problem List   Diagnosis    Atypical chest pain    Morbid obesity ([address] )    Precordial pain    Gastroesophageal reflux disease without esophagitis    Snoring    Hemorrhoids    Constipation    BRBPR (bright red blood per rectum)    Acute mastoiditis of right side       Objective     /76 (BP Location: Left arm, Patient Position: Sitting, Cuff Size: Large)   Pulse 86   Temp (!) 97 3 °F (36 3 °C) (Temporal)   Resp 20   Ht 5' 7" (1 702 m)   Wt 134 kg (295 lb)   SpO2 98%   BMI 46 20 kg/m²     Physical Exam  Vitals and nursing note reviewed  Constitutional:       Appearance: Normal appearance  He is well-developed  He is obese  HENT:      Head: Normocephalic and atraumatic  Right Ear: Tympanic membrane, ear canal and external ear normal       Left Ear: Tympanic membrane, ear canal and external ear normal       Nose: Nose normal    Eyes:      Conjunctiva/sclera: Conjunctivae normal       Pupils: Pupils are equal, round, and reactive to light  Cardiovascular:      Rate and Rhythm: Normal rate and regular rhythm  Heart sounds: Normal heart sounds  No murmur heard  No gallop  Pulmonary:      Effort: Pulmonary effort is normal  No respiratory distress  Breath sounds: Normal breath sounds  No wheezing or rales  Abdominal:      General: There is no distension  Palpations: Abdomen is soft  Tenderness: There is no abdominal tenderness  Musculoskeletal:         General: Tenderness present  No deformity or signs of injury  Cervical back: Normal range of motion and neck supple  Right lower leg: Edema present  Left lower leg: No edema  Comments: Swelling mainly over the right foot and tenderness along the fifth metatarsal   Lymphadenopathy:      Cervical: No cervical adenopathy  Skin:     Coloration: Skin is not pale  Findings: No erythema or rash  Neurological:      General: No focal deficit present  Mental Status: He is alert  Mental status is at baseline     Psychiatric:         Mood and Affect: Mood normal          Behavior: Behavior normal          Laboratory Results: I have personally reviewed the pertinent laboratory results/reports     Radiology/Other Diagnostic Testing Results: I have personally reviewed pertinent reports  CT orbits/temporal bones/skull base w contrast    Result Date: 8/12/2022  CT ORBITS WITH CONTRAST  INDICATION:   Mastoiditis r/o mastoiditis  COMPARISON: None TECHNIQUE: Axial CT imaging through the orbits was performed  In addition, sagittal and coronal reformatted images were submitted for interpretation  Radiation dose length product (DLP) for this visit:  551 3 mGy-cm   This examination, like all CT scans performed in the [facility], was performed utilizing techniques to minimize radiation dose exposure, including the use of iterative reconstruction and automated exposure control  IV Contrast:  70 mL of iohexol (OMNIPAQUE) IMAGE QUALITY: Diagnostic  FINDINGS: ORBITS:Normal orbital soft tissues  No mass or abnormal enhancement  SINUSES: Partial opacification of the right mastoid air cells  There is fluid within the right middle ear cavity  Minimal opacification of the left mastoid air cells  The left middle ear cavity is clear  Polypoid mucosal thickening in the right maxillary sinus  Minimal mucosal thickening in the left maxillary sinus  SOFT TISSUES: Preseptal and facial soft tissues are unremarkable  BONY STRUCTURES: Normal bony structures  No definite osseous erosion  Partial opacification of the right mastoid air cells and fluid within the right middle ear cavity compatible with otomastoiditis   Workstation performed: [id number]        Current Medications     Current Outpatient Medications:     acetaminophen (TYLENOL) 325 mg tablet, Take 3 tablets (975 mg total) by mouth every 8 (eight) hours, Disp: , Rfl: 0    cefdinir (OMNICEF) 300 mg capsule, Take 1 capsule (300 mg total) by mouth every 12 (twelve) hours for 7 days, Disp: 14 capsule, Rfl: 0    loratadine (CLARITIN) 10 mg tablet, Take 10 mg by mouth if needed, Disp: , Rfl:     Multiple Vitamins-Minerals (Multivitamin Adult) CHEW, Chew (Patient not taking: Reported on 8/18/2022), Disp: , Rfl:     polyethylene glycol (GLYCOLAX) 17 GM/SCOOP powder, Take 17 g by mouth daily (Patient not taking: Reported on 8/18/2022), Disp: 507 g, Rfl: 0    senna-docusate sodium (SENOKOT-S) 8 6-50 mg per tablet, Take 1 tablet by mouth 2 (two) times a day as needed for constipation (Patient not taking: Reported on 8/18/2022), Disp: 20 tablet, Rfl: 0    Joanne Guerin MD  [address] [place] [address]

## 2022-08-22 ENCOUNTER — OFFICE VISIT (OUTPATIENT)
Dept: AUDIOLOGY | Facility: CLINIC | Age: 37
End: 2022-08-22
Payer: COMMERCIAL

## 2022-08-22 ENCOUNTER — OFFICE VISIT (OUTPATIENT)
Dept: OTOLARYNGOLOGY | Facility: CLINIC | Age: 37
End: 2022-08-22

## 2022-08-22 VITALS — WEIGHT: 295 LBS | BODY MASS INDEX: 46.3 KG/M2 | HEIGHT: 67 IN | TEMPERATURE: 97.4 F

## 2022-08-22 DIAGNOSIS — H93.291 OTHER ABNORMAL AUDITORY PERCEPTIONS, RIGHT EAR: ICD-10-CM

## 2022-08-22 DIAGNOSIS — Z01.10 NORMAL BEHAVIORAL AUDIOMETRY: Primary | ICD-10-CM

## 2022-08-22 DIAGNOSIS — Z01.10 NORMAL HEARING TEST: Primary | ICD-10-CM

## 2022-08-22 PROCEDURE — 92557 COMPREHENSIVE HEARING TEST: CPT | Performed by: AUDIOLOGIST

## 2022-08-22 PROCEDURE — 99204 OFFICE O/P NEW MOD 45 MIN: CPT | Performed by: STUDENT IN AN ORGANIZED HEALTH CARE EDUCATION/TRAINING PROGRAM

## 2022-08-22 PROCEDURE — 92567 TYMPANOMETRY: CPT | Performed by: AUDIOLOGIST

## 2022-08-22 NOTE — PROGRESS NOTES
ADULT ENT HEARING EVALUATION - [place] [address] AUDIOLOGY      Patient Name: Saeed Taylor   MRN:  [id number]   :  1985   Age: 30 y o  Gender: male   DOS: 2022     HISTORY:     Saeed Taylor, a 30 y o  male, was seen on 2022 at the referral of Nishi Chamorro MD, for an evaluation of hearing as part of his ENT visit  Mr Andre Garcia primary complaint is right mastoiditis  He denied otalgia, otorrhea, aural fullness, tinnitus and dizziness  Mr Michelle Kirk has not had his hearing tested previously  RESULTS:    Otoscopic Evaluation:   Right Ear: Unremarkable, canal clear   Left Ear: Unremarkable, canal clear    Tympanometry:   Right Ear: Type A; normal middle ear pressure and static compliance    Left Ear: Type A; normal middle ear pressure and static compliance     Audiometry:  Conventional pure tone audiometry from 250 - 8000 Hz  obtained with good reliability and revealed the following:     Right Ear: normal hearing sensitivity   Left Ear: normal hearing sensitivity     Speech Audiometry:    Speech Reception (SRT)   Right Ear: 10 dB HL   Left Ear: 10 dB HL    Word Recognition Scores (WRS):  Right Ear: excellent (100 % correct)     Left Ear: excellent (100 % correct)   Stimuli: NU-6    *see attached audiogram*      RECOMMENDATIONS:    1 ) Follow-up with referring provider  2 ) Further audiometric testing PRN  It was a pleasure working with Mr Michelle Kirk today  Thank you for referring this patient  David Servin    Clinical Audiologist    [address] [place] [phone]

## 2022-08-22 NOTE — PROGRESS NOTES
[facility]  [facility] [address] [facility] [facility] Otolaryngology  Otolaryngology -- Head and Neck Surgery New Patient Visit  Renetta Mcgrath is a 30 y o  who presents with a chief complaint of     Had a recent ear infection  Admitted to the hospital for ear pain  Had CT scan and showed some right middle ear and mastoid effusion  Hearing tests performed today and showed:  Tympanogram bilateral type A  Audiogram normal     Review of systems: Pertinent review of systems documented in the HPI  10 point ROS documented in a separate note, as necessary  Results reviewed; images from any scan have been personally reviewed: The past medical, surgical, social and family history have been reviewed as documented in today's record    Past Medical History:   Diagnosis Date    Constipation     Hemorrhoids     Seasonal allergies      Past Surgical History:   Procedure Laterality Date    NO PAST SURGERIES       Family History   Problem Relation Age of Onset    Hypertension Mother     Stroke Father     No Known Problems Sister     No Known Problems Brother     No Known Problems Maternal Aunt     No Known Problems Maternal Uncle     No Known Problems Paternal Aunt     No Known Problems Paternal Uncle     No Known Problems Maternal Grandmother     No Known Problems Maternal Grandfather     No Known Problems Paternal Grandmother     No Known Problems Paternal Grandfather      Current Outpatient Medications on File Prior to Visit   Medication Sig Dispense Refill    acetaminophen (TYLENOL) 325 mg tablet Take 3 tablets (975 mg total) by mouth every 8 (eight) hours  0    loratadine (CLARITIN) 10 mg tablet Take 10 mg by mouth if needed      [] cefdinir (OMNICEF) 300 mg capsule Take 1 capsule (300 mg total) by mouth every 12 (twelve) hours for 7 days 14 capsule 0    Multiple Vitamins-Minerals (Multivitamin Adult) CHEW Chew (Patient not taking: No sig reported)       No current facility-administered medications on file prior to visit  Physical exam:   Temp (!) 97 4 °F (36 3 °C) (Temporal)   Ht 5' 7" (1 702 m)   Wt 134 kg (295 lb)   BMI 46 20 kg/m²   Head: Atraumatic, no visible scalp lesions, parotid and submandibular salivary glands non-tender to palpation and without masses bilaterally  Neck:  No visible or palpable cervical lesions or lymphadenopathy, thyroid gland is normal in size and symmetry and without masses, normal laryngeal elevation with swallowing  Ears:    Right ear :  Auricle normal in appearance, mastoid prominence non-tender, external auditory canal clear  Tympanic membranes intact  Left ear :  Auricle normal in appearance, mastoid prominence non-tender, external auditory canal clear   Tympanic membranes intact  Nose/Sinuses:  External appearance unremarkable, no maxillary or frontal sinus tenderness to palpation bilaterally  Anterior rhinoscopy reveals:   Oral Cavity:  Moist mucus membranes, gums and dentition unremarkable, no oral mucosal masses or lesions, floor of mouth soft, tongue mobile without masses or lesions  Oropharynx:  Base of tongue soft and without masses, tonsils bilaterally unremarkable, soft palate mucosa unremarkable  Eyes:  Extra-ocular movements intact, pupils equally round and reactive to light and accommodation, the lids and conjunctivae are normal in appearance  Constitutional:  Well developed, well nourished and groomed, in no acute distress  Cardiovascular:  Normal rate and rhythm, no palpable thrills, no jugulovenous distension observed  Respiratory:  Normal respiratory effort without evidence of retractions or use of accessory muscles  Neurologic:  Cranial nerves II-XII intact bilaterally  Abdomen: Soft and lax  Extremities: No bruises   Psychiatric:  Alert and oriented to time, place and person  Procedures    Assessment:   1   Normal hearing test       Orders  No orders of the defined types were placed in this encounter  Discussion/Plan:      Resolving otitis media    Normal hearing test   Audiogram and tympanogram today indicated normal hearing

## 2022-09-09 ENCOUNTER — OFFICE VISIT (OUTPATIENT)
Dept: GASTROENTEROLOGY | Facility: CLINIC | Age: 37
End: 2022-09-09

## 2022-09-09 VITALS
WEIGHT: 278 LBS | HEIGHT: 67 IN | BODY MASS INDEX: 43.63 KG/M2 | HEART RATE: 106 BPM | SYSTOLIC BLOOD PRESSURE: 123 MMHG | DIASTOLIC BLOOD PRESSURE: 84 MMHG

## 2022-09-09 DIAGNOSIS — K62.5 BRBPR (BRIGHT RED BLOOD PER RECTUM): Primary | ICD-10-CM

## 2022-09-09 DIAGNOSIS — K64.9 HEMORRHOIDS, UNSPECIFIED HEMORRHOID TYPE: ICD-10-CM

## 2022-09-09 PROCEDURE — 46221 LIGATION OF HEMORRHOID(S): CPT | Performed by: INTERNAL MEDICINE

## 2022-09-09 NOTE — PROGRESS NOTES
Anal Excision Procedures  Performed by: Prachi Fairbanks MD  Authorized by: Prachi Fairbanks MD     Universal Protocol:     Verbal consent obtained?: Yes      Written consent obtained?: Yes      Risks and benefits: Risks, benefits and alternatives were discussed      Consent given by:  Patient    Patient states understanding of procedure being performed: Yes      Patient identity confirmed:  Verbally with patient  A time out verifies correct patient, procedure, equipment, support staff and site/side marked as required:   Procedure Type: hemorrhoidectomy    Hemorrhoidectomy Details:   Hemorrhoid type: internal    Internal position:   Three o'clock  Single rubber band ligation

## 2022-10-19 NOTE — UTILIZATION REVIEW
URGENT/EMERGENT  INPATIENT/SPU AUTHORIZATION REQUEST    Date: 10/19/22            # Pages in this Request:     X New Request   Additional Information for PA#:     Office Contact Name:  Adelso Rosado Title: Utilization Review, [facility] Nurse     Phone: [phone]  Ext  Availability (Date/Time): Wednesday - Friday 8 am- 4 pm    X Inpatient Review  SPU Review        Current       X Late Pick-up   · How your facility was first notified of the Late Pick-up: PATHS  · When your facility was first notified of the Late Pick-up (date): 10/10/22         RECIPIENT INFORMATION    Recipient QK#:[id number]    Recipient Name: Sherwin Carrillo    YOB: 1985  30 y o  Recipient Alias:     Gender:  X Male  Female Medicaid Eligibility ([address] [place] [address]): INSURANCE INFORMATION    (All other private or governmental health insurance benefits must be utilized prior to billing the [place] Program)    Was this admission the result of an MVA, other accident, assault, injury, fall, gunshot, bite etc ? Yes X No                   If yes, provide a brief description of the incident  Does the recipient have other insurance coverage? Yes X No        Insurance Company Name/Policy #      Did that insurance pay on this claim? Yes  No        Did that insurance deny this claim? Yes  No    If yes, reason for denial:      Does the recipient have Medicare? Yes X No        Did Medicare exhaust prior to this admission? Yes  No            Did Medicare partially pay this claim? Yes  No        Did that insurance deny this claim? Yes  No    If yes, reason for denial:          Was the recipient a prisoner at the time of admission?    Yes X No            PROVIDER INFORMATION    Hospital [facility]: [facility] [address] Provider ID#: [id number]    [address] [place] [address] Physician Name: Katja SINGH [facility] Provider ID#: [id number]        ADMISSION INFORMATION    Type of Admission: (please choose one)    X ED      Direct If yes, from where? Transfer    If yes, transferring hospital (inpatient, rehab, or psych) Provider Name/Provider ID#: Admission Floor or Unit Type: MED-SURG    Dates/Times:        ED Date/Time: 8/12/2022  02:25 AM        Observation Date/Time:         Admission Date/Time: 8/12/22  04:19 AM        Discharge or Transfer Date/Time: 8/14/2022 1043 PM        DIAGNOSIS/PROCEDURE CODES    Primary Diagnosis Code/Primary Diagnosis Code description:   Earache [H92 09]  Right otitis media [H66 91]  Mastoiditis of right side [H70 91]  (MAY RE-ORDER DX CODES)  Additional Diagnosis Code(s) and Description(s)-(up to three additional codes):     Procedure Code (one) and description:NONE        CLINICAL INFORMATION - PRIOR ADMISSION ONLY    Is there a prior admission with a discharge date within 30 days of the date of this admission? X No (Proceed to the next section - "Clinical Information - General Review Checklist:)      Yes (Provide the following information)     Prior admission dates:    MA Prior Authorization Number:        Review Outcome:     Diagnosis Code(s)/Description:    Procedure Code/Description:    Findings:    Treatment:    Condition on Discharge:   Vitals:    Labs:   Imaging:   Medications: Follow-up Instructions:    Disposition:        CLINICAL INFORMATION - GENERAL REVIEW CHECKLIST    EMERGENCY DEPARTMENT: (Proceed to "ADMISSION" if Direct Admission)    Presenting Signs/Symptoms:    Earache       Pt states he was seen at an urgent care for the same and is taking amoxicillin with no relief  Pt states it feels like he is underwater         Initial Presentation: 30 y o  male with PMH of recently diagnosed Jv Boards presents to the ED from home with worsening right ear pain after receiving Augmentin from PCP for right acute otitis media on 08/06/22  Patient reports that he took his antibiotics as prescribed  He states the pain is currently in the back of his ear   PE: TTP of right mastoid with some mild overlying erythema; no evidence of vesicular rash      8/12 Plan: Inpatient admission for evaluation and treatment of acute R mastoiditis:  IV cefepime and IV vancomycin, pain management       8/13 Internal Medicine: Patient complaining of right ear pain and tenderness  Improved since admission but still uncomfortable  Patient also stated he started with this sensation of water in his ear this morning  Continue IV abx, MRSA swab pending  HgbA1c 6 2, initial accu-checks and SSI while admitted, start carb controlled diet       8/14 Discharged to home/self care  Medication/treatment prior to arrival in the ED:     Past Medical History:    Active Ambulatory Problems     Diagnosis Date Noted   • Atypical chest pain 01/21/2021   • Morbid obesity ([address] ) 01/21/2021   • Precordial pain 01/21/2021   • Gastroesophageal reflux disease without esophagitis 01/21/2021   • Snoring 01/21/2021   • Hemorrhoids 07/06/2022   • Constipation 07/06/2022   • BRBPR (bright red blood per rectum) 07/06/2022     Resolved Ambulatory Problems     Diagnosis Date Noted   • No Resolved Ambulatory Problems     Past Medical History:   Diagnosis Date   • Seasonal allergies        Clinical Exam:     Initial Vital Signs: (Temp, Pulse, Resp, and BP)   ED Triage Vitals   Temperature Pulse Respirations Blood Pressure SpO2   08/12/22 0230 08/12/22 0230 08/12/22 0230 08/12/22 0230 08/12/22 0230   98 7 °F (37 1 °C) 83 16 145/88 98 %      Temp Source Heart Rate Source Patient Position - Orthostatic VS BP Location FiO2 (%)   08/12/22 0230 08/12/22 0430 08/12/22 0230 08/12/22 0230 --   Oral Monitor Sitting Right arm       Pain Score       08/12/22 0230       8           Pertinent Repeat Vital Signs: (include times they were obtained)  Date/Time Temp Pulse Resp BP MAP (mmHg) SpO2 O2 Device   08/14/22 0713 97 2 °F (36 2 °C) Abnormal  59 18 121/81 92 98 % None (Room air)   08/14/22 0054 98 2 °F (36 8 °C) 71 18 111/84 93 97 % None (Room air)   08/13/22 1450 99 2 °F (37 3 °C) 71 18 108/71 85 96 % None (Room air)   08/13/22 0713 98 °F (36 7 °C) 74 18 117/74 89 97 % None (Room air)   08/12/22 2210 -- 67 16 117/66 85 97 % --   08/12/22 1512 98 3 °F (36 8 °C) 63 16 100/59 -- 96 % --   08/12/22 0733 97 6 °F (36 4 °C) 63 16 94/63 -- 97 % --   08/12/22 0458 98 6 °F (37 °C) 71 16 106/66 -- 100 % None (Room air)   08/12/22 0430 -- 73 18 135/61 -- 98 % None (Room air)          Pertinent Sustained Findings: (include times they were obtained)    Weight in Kilograms:    08/13/22 132 kg (291 lb)          Pertinent Labs (results):  Results from last 7 days   Lab Units 08/14/22  0501 08/12/22  0515 08/12/22  0424   WBC Thousand/uL 9 17 9 87 9 72   HEMOGLOBIN g/dL 14 2 12 7 12 4   HEMATOCRIT % 42 2 37 7 36 3*   PLATELETS Thousands/uL 323 301 287   NEUTROS ABS Thousands/µL 4 73 6 01 6 06                 Results from last 7 days   Lab Units 08/14/22  0501 08/12/22  0515 08/12/22  0424   SODIUM mmol/L 138 139 137   POTASSIUM mmol/L 4 0 4 0 4 1   CHLORIDE mmol/L 102 103 106   CO2 mmol/L 26 26 23   ANION GAP mmol/L 10 10 8   BUN mg/dL 13 17 16   CREATININE mg/dL 0 87 0 95 0 81   EGFR ml/min/1 73sq m 111 103 115   CALCIUM mg/dL 9 5 9 3 7 9*           Results from last 7 days   Lab Units 08/12/22  0424   AST U/L 20   ALT U/L 19   ALK PHOS U/L 61   TOTAL PROTEIN g/dL 6 1*   ALBUMIN g/dL 3 5   TOTAL BILIRUBIN mg/dL 0 28               Results from last 7 days   Lab Units 08/14/22  1125 08/14/22  0720 08/13/22  2115 08/13/22  1537 08/13/22  1205   POC GLUCOSE mg/dl 99 87 103 99 124             Results from last 7 days   Lab Units 08/14/22  0501 08/12/22  0515 08/12/22  0424   GLUCOSE RANDOM mg/dL 84 104 96               Results from last 7 days   Lab Units 08/12/22  0424   HEMOGLOBIN A1C % 6 2*   EAG mg/dl 131        Radiology (results):  CT orbits/temporal bones/skull base w contrast   Final Result by Arlette Nieves MD (08/12 0400)       Partial opacification of the right mastoid air cells and fluid within the right middle ear cavity compatible with otomastoiditis  EKG (results): Other tests (results):    Tests pending final results:    Treatment in the ED:   Medication Administration from 08/12/2022 0225 to 08/12/2022 5894       Date/Time Order Dose Route Action Action by Comments     08/12/2022 0253 ketorolac (TORADOL) injection 30 mg 30 mg Intravenous Given                  08/12/2022 0415 morphine injection 6 mg 6 mg Intravenous Given       08/12/2022 0426 oxymetazoline (AFRIN) 0 05 % nasal spray 2 spray 2 spray Each Nare Given             Meds: Name, dose, route, time, number of doses given        Nebulizer treatments: Type, total number given      IVs: Type, rate, total amt  given          Other treatments:       Change in condition while in the ED:       Response to ED Treatment:           OBSERVATION: (Proceed to "ADMISSION" if Direct Admission)    Orders written during the observation period  Meds Name, dose, route, time, how may doses given:  PRN Meds Name, dose, route, time, how many doses given within the first 24 hrs :  IVs Type, rate, and total amt  ordered/given:  Labs, imaging, other:  Consults and findings:    Test Results during the observation period  Pertinent Lab tests (dates/results):  Culture results (blood, urine, spinal, wound, respiratory, etc ):  Imaging tests (dates/results):  EKG (dates/results):   Other test (dates/results):  Tests pending (dates/results):    Surgical or Invasive Procedures during the observation period  Name of surgery/procedure:  Date & Time:  Patient Response:  Post-operative orders:  Operative Report/Findings:    Response to Treatment, Major Change in Condition, Major Charge in Treatment during the observation period          ADMISSION:    DIRECT Admissions Only:    · Presenting Signs/Symptoms:   ·   · Medication/treatment prior to arrival:  ·   · Past Medical History:  ·   · Clinical Exam on admission:  ·   · Vital Signs on admission: (Temp, Pulse, Resp, and BP)  ·   · Weight in kilograms:     ALL Admissions:    Admission Orders and Other Orders written within the first 24 hrs after admission  SCD  Meds Name, dose, route, time, how may doses given:  Medication Dose Route Frequency   • acetaminophen  975 mg Oral Q8H   • cefepime  2,000 mg Intravenous Q8H   • enoxaparin  40 mg Subcutaneous BID   • HYDROmorphone  1 mg Intravenous Q4H PRN   • insulin lispro  1-5 Units Subcutaneous TID AC   • insulin lispro  1-5 Units Subcutaneous HS   • oxyCODONE  10 mg Oral Q4H PRN   • oxyCODONE  5 mg Oral Q4H PRN   • vancomycin  15 mg/kg (Adjusted) Intravenous Q8H          PRN Meds Name, dose, route, time, how many doses given within the first 24 hrs :  IVs Type, rate, and total amt  ordered/given:  Labs, imaging, other:      Consults and findings:  * Acute mastoiditis of right side  Assessment & Plan  Patient presents with worsening right ear pain after receiving Augmentin from PCP for right acute otitis media on 08/06/22  Of note, patient also seen in the ED 07/20/22 for right sided facial droop and decreased sensation on right side of the face and was diagnosed with [place] Palsy and discharged with acyclovir x 7 days and prednisone x 4 days  · CT shows right otomastoiditis  · Currently afebrile and without leukocytosis  · Patient currently without evidence of facial asymmetry, no rash on the right side of the face or ear    · Discussed with ENT on call, no need to transfer for ENT evaluation at this time  · IV Cefepime 2g TID + Vancomycin   · Pain management   · If no improvement from IV abx, patient may require transfer for ENT intervention      Morbid obesity ([facility] [address] )  Assessment & Plan  · Encourage weightloss/lifestyle modification   · Check HgbA1c    Test Results after admission  Pertinent Lab tests (dates/results):  Culture results (blood, urine, spinal, wound, respiratory, etc ):  Imaging tests (dates/results):  EKG (dates/results):   Other test (dates/results):  Tests pending (dates/results):    Surgical or Invasive Procedures  Name of surgery/procedure:  Date & Time:  Patient Response:  Post-operative orders:  Operative Report/Findings:    Response to Treatment, Major Change in Condition, Major Charge in Treatment anytime during admission    Disposition on Discharge  Home, Rehab, SNF, LTC, Shelter, etc : Home/Self Care    Cease to Breathe (CTB)  If a patient expires during an admission, in addition to the above information, please include:    Summary/timeline of the patient's decline in condition:    Medications and treatment:    Patient response to treatment:    Date and time patient ceased to breathe:        Is there a Readmission that follows this admission? Yes X No    If yes, reason for denial:          InterQual Review    InterQual Criteria Met:  Yes X No  N/A        Please include the InterQual Review, InterQual year/version used, and the criteria selected:    Criteria Set Name - Subset   LOC:Acute Adult-Infection: General      Criteria Review   REVIEW SUMMARY     InterQual® Review Status:  In Primary  Criteria Status: Not Met  Day of review: Episode Day 1  Condition Specific: Yes        REVIEW DETAILS     Product: [facility] Adult  Subset: Infection: General        (Symptom or finding within 24h)     (Excludes PO medications unless noted)     Select Day, One:          [  ] Episode Day 1, One:              [  ] ACUTE, One:                  [  ] Other infection actual or suspected, >= One:                      [  ] Mastoiditis and, All:                          [X] Confirmed by x-ray or CT                          [X] Erythema or pain over mastoid area                          [X] Anti-infective        Version: InterQual® 2022, Apr 2022 Release           PLEASE SUBMIT THE COMPLETED FORM TO [address] [place] [address] [phone] or VIA E-MAIL TO [email or web address]    Signature: Adilia Pack Blanche Date:  10/19/22    Confidentiality Notice: The documents accompanying this telecopy may contain confidential information belonging to the sender  The information is intended only for the use of the individual named above  If you are not the intended recipient, you are hereby notified  That any disclosure, copying, distribution or taking of any telecopy is strictly prohibited

## 2024-06-03 ENCOUNTER — OFFICE VISIT (OUTPATIENT)
Dept: FAMILY MEDICINE CLINIC | Facility: CLINIC | Age: 39
End: 2024-06-03

## 2024-06-03 VITALS
TEMPERATURE: 98 F | BODY MASS INDEX: 46.49 KG/M2 | HEART RATE: 90 BPM | RESPIRATION RATE: 18 BRPM | SYSTOLIC BLOOD PRESSURE: 122 MMHG | HEIGHT: 67 IN | DIASTOLIC BLOOD PRESSURE: 60 MMHG | WEIGHT: 296.2 LBS | OXYGEN SATURATION: 96 %

## 2024-06-03 DIAGNOSIS — Z23 ENCOUNTER FOR IMMUNIZATION: ICD-10-CM

## 2024-06-03 DIAGNOSIS — Z13.6 ENCOUNTER FOR LIPID SCREENING FOR CARDIOVASCULAR DISEASE: ICD-10-CM

## 2024-06-03 DIAGNOSIS — L20.9 ATOPIC DERMATITIS, UNSPECIFIED TYPE: ICD-10-CM

## 2024-06-03 DIAGNOSIS — E66.01 CLASS 3 SEVERE OBESITY DUE TO EXCESS CALORIES WITH SERIOUS COMORBIDITY AND BODY MASS INDEX (BMI) OF 45.0 TO 49.9 IN ADULT (HCC): ICD-10-CM

## 2024-06-03 DIAGNOSIS — Z13.220 ENCOUNTER FOR LIPID SCREENING FOR CARDIOVASCULAR DISEASE: ICD-10-CM

## 2024-06-03 DIAGNOSIS — L74.0 PRICKLY HEAT: ICD-10-CM

## 2024-06-03 DIAGNOSIS — Z00.01 ENCOUNTER FOR GENERAL ADULT MEDICAL EXAMINATION WITH ABNORMAL FINDINGS: Primary | ICD-10-CM

## 2024-06-03 DIAGNOSIS — Z13.1 ENCOUNTER FOR SCREENING FOR DIABETES MELLITUS: ICD-10-CM

## 2024-06-03 PROCEDURE — 99214 OFFICE O/P EST MOD 30 MIN: CPT | Performed by: FAMILY MEDICINE

## 2024-06-03 PROCEDURE — 99395 PREV VISIT EST AGE 18-39: CPT | Performed by: FAMILY MEDICINE

## 2024-06-03 RX ORDER — PREDNISONE 20 MG/1
20 TABLET ORAL 2 TIMES DAILY WITH MEALS
Qty: 10 TABLET | Refills: 0 | Status: SHIPPED | OUTPATIENT
Start: 2024-06-03 | End: 2024-06-08

## 2024-06-03 RX ORDER — CLOBETASOL PROPIONATE 0.5 MG/G
OINTMENT TOPICAL 2 TIMES DAILY
Qty: 30 G | Refills: 0 | Status: SHIPPED | OUTPATIENT
Start: 2024-06-03

## 2024-06-03 NOTE — PROGRESS NOTES
Adult Annual Physical  Name: You Oliva      : 1985      MRN: [id number]  Encounter Provider: Annabelle Rodríguez MD  Encounter Date: 6/3/2024   Encounter department: [facility] MEDICINE    Assessment & Plan   1. Encounter for general adult medical examination with abnormal findings  -     CBC and differential; Future  -     Comprehensive metabolic panel; Future  -     TSH, 3rd generation with Free T4 reflex; Future  2. Encounter for immunization  3. Encounter for lipid screening for cardiovascular disease  -     Lipid panel; Future  4. Encounter for screening for diabetes mellitus  -     Hemoglobin A1C; Future  5. Class 3 severe obesity due to excess calories with serious comorbidity and body mass index (BMI) of 45.0 to 49.9 in adult (HCC)  -     Cortisol Level, AM Specimen; Future  6. Atopic dermatitis, unspecified type  -     predniSONE 20 mg tablet; Take 1 tablet (20 mg total) by mouth 2 (two) times a day with meals for 5 days  -     clobetasol (TEMOVATE) 0.05 % ointment; Apply topically 2 (two) times a day  7. Prickly heat    Obtain labs, start prednisone and clobetasol thereafter, use water cooler, wear fast drying and cool clothing, avoid hot shower.    Immunizations and preventive care screenings were discussed with patient today. Appropriate education was printed on patient's after visit summary.    Counseling:  Alcohol/drug use: discussed moderation in alcohol intake, the recommendations for healthy alcohol use, and avoidance of illicit drug use.  Dental Health: discussed importance of regular tooth brushing, flossing, and dental visits.  Injury prevention: discussed safety/seat belts, safety helmets, smoke detectors, carbon dioxide detectors, and smoking near bedding or upholstery.  Sexual health: discussed sexually transmitted diseases, partner selection, use of condoms, avoidance of unintended pregnancy, and contraceptive alternatives.  Exercise: the importance of regular  exercise/physical activity was discussed. Recommend exercise 3-5 times per week for at least 30 minutes.          History of Present Illness     Adult Annual Physical:  Patient presents for annual physical. 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     Diet and Physical Activity:  - Diet/Nutrition: frequent junk food.  - Exercise: no formal exercise.    Depression Screening:  - PHQ-2 Score: 0    Review of Systems   Constitutional:  Negative for chills and fever.   HENT:  Negative for congestion, rhinorrhea and sore throat.    Eyes:  Negative for discharge, redness and itching.   Respiratory:  Negative for chest tightness, shortness of breath and wheezing.    Cardiovascular:  Negative for chest pain and palpitations.   Gastrointestinal:  Negative for abdominal pain, constipation and diarrhea.   Genitourinary:  Negative for dysuria.   Skin:  Positive for rash. Negative for pallor.   Neurological:  Negative for dizziness, weakness, numbness and headaches.     Medical History Reviewed by provider this encounter:  Tobacco  Allergies  Meds  Problems  Med Hx  Surg Hx  Fam Hx       Current Outpatient Medications on File Prior to Visit   Medication Sig Dispense Refill    acetaminophen (TYLENOL) 325 mg tablet Take 3 tablets (975 mg total) by mouth every 8 (eight) hours (Patient not taking: Reported on 6/3/2024)  0    loratadine (CLARITIN) 10 mg tablet Take 10 mg by mouth if needed (Patient not taking: Reported on 6/3/2024)      Multiple Vitamins-Minerals (Multivitamin Adult) CHEW Chew (Patient not taking: Reported on 8/18/2022)       No current facility-administered medications on file prior to visit.      Social History     Tobacco Use    Smoking status: Never    Smokeless tobacco: Never   Vaping Use    Vaping status: Never Used   Substance and Sexual Activity    Alcohol use: Not Currently     Comment: rare    Drug use: Never    Sexual  "activity: Not on file       Objective     /60 (BP Location: Right arm, Patient Position: Sitting, Cuff Size: Large)   Pulse 90   Temp 98 °F (36.7 °C) (Tympanic)   Resp 18   Ht 5' 7\" (1.702 m)   Wt 134 kg (296 lb 3.2 oz)   SpO2 96%   BMI 46.39 kg/m²     Physical Exam  Vitals and nursing note reviewed.   Constitutional:       General: He is not in acute distress.     Appearance: Normal appearance. He is well-developed. He is obese. He is not ill-appearing or toxic-appearing.   HENT:      Head: Normocephalic and atraumatic.      Right Ear: Tympanic membrane, ear canal and external ear normal.      Left Ear: Tympanic membrane, ear canal and external ear normal.      Nose: No mucosal edema or rhinorrhea.      Mouth/Throat:      Mouth: Mucous membranes are moist. Mucous membranes are not pale and not cyanotic.      Pharynx: Oropharynx is clear. No oropharyngeal exudate or posterior oropharyngeal erythema.   Eyes:      General: No scleral icterus.        Right eye: No discharge.         Left eye: No discharge.      Extraocular Movements: Extraocular movements intact.      Conjunctiva/sclera: Conjunctivae normal.      Pupils: Pupils are equal, round, and reactive to light.   Cardiovascular:      Rate and Rhythm: Normal rate and regular rhythm.      Heart sounds: Normal heart sounds. No murmur heard.     No gallop.   Pulmonary:      Effort: Pulmonary effort is normal. No respiratory distress.      Breath sounds: Normal breath sounds. No wheezing or rales.   Abdominal:      General: Abdomen is flat.      Palpations: Abdomen is soft.      Tenderness: There is no abdominal tenderness.   Musculoskeletal:         General: No swelling, tenderness, deformity or signs of injury.      Cervical back: Normal range of motion.      Right lower leg: No edema.      Left lower leg: No edema.   Skin:     General: Skin is warm.      Coloration: Skin is not jaundiced or pale.      Findings: Rash (small papular rash on extremities " with mild erythema) present.   Neurological:      General: No focal deficit present.      Mental Status: He is alert and oriented to person, place, and time.   Psychiatric:         Mood and Affect: Mood normal.         Behavior: Behavior normal.         Thought Content: Thought content normal.         Judgment: Judgment normal.       Administrative Statements

## 2024-07-25 ENCOUNTER — TELEPHONE (OUTPATIENT)
Age: 39
End: 2024-07-25

## 2024-07-25 NOTE — TELEPHONE ENCOUNTER
Patients spouse called in and wanted to make sure labs were ordered for him to have done. Advised her fasting labs were ordered and does not need lab slips since they are going to [facility].